# Patient Record
Sex: FEMALE | Race: BLACK OR AFRICAN AMERICAN | Employment: UNEMPLOYED | URBAN - NONMETROPOLITAN AREA
[De-identification: names, ages, dates, MRNs, and addresses within clinical notes are randomized per-mention and may not be internally consistent; named-entity substitution may affect disease eponyms.]

---

## 2022-01-16 ENCOUNTER — APPOINTMENT (OUTPATIENT)
Dept: GENERAL RADIOLOGY | Age: 62
End: 2022-01-16
Attending: EMERGENCY MEDICINE
Payer: COMMERCIAL

## 2022-01-16 ENCOUNTER — HOSPITAL ENCOUNTER (EMERGENCY)
Age: 62
Discharge: ANOTHER HOSPITAL | End: 2022-01-16
Attending: EMERGENCY MEDICINE
Payer: COMMERCIAL

## 2022-01-16 ENCOUNTER — HOSPITAL ENCOUNTER (INPATIENT)
Age: 62
LOS: 1 days | Discharge: HOME | DRG: 310 | End: 2022-01-17
Attending: EMERGENCY MEDICINE | Admitting: HOSPITALIST
Payer: COMMERCIAL

## 2022-01-16 VITALS
RESPIRATION RATE: 20 BRPM | SYSTOLIC BLOOD PRESSURE: 114 MMHG | BODY MASS INDEX: 36.19 KG/M2 | OXYGEN SATURATION: 98 % | HEIGHT: 64 IN | TEMPERATURE: 98.8 F | DIASTOLIC BLOOD PRESSURE: 78 MMHG | WEIGHT: 212 LBS | HEART RATE: 96 BPM

## 2022-01-16 DIAGNOSIS — I48.91 NEW ONSET ATRIAL FIBRILLATION (HCC): Primary | ICD-10-CM

## 2022-01-16 DIAGNOSIS — I48.91 ATRIAL FIBRILLATION WITH RVR (HCC): Primary | ICD-10-CM

## 2022-01-16 LAB
ALBUMIN SERPL-MCNC: 4.2 G/DL (ref 3.5–5)
ALBUMIN/GLOB SERPL: 1.1 {RATIO} (ref 1.1–2.2)
ALP SERPL-CCNC: 70 U/L (ref 45–117)
ALT SERPL-CCNC: 29 U/L (ref 12–78)
ANION GAP SERPL CALC-SCNC: 12 MMOL/L (ref 5–15)
APTT PPP: 23.4 SEC (ref 22.1–31)
AST SERPL W P-5'-P-CCNC: 23 U/L (ref 15–37)
BASOPHILS # BLD: 0.1 K/UL (ref 0–0.2)
BASOPHILS NFR BLD: 1 % (ref 0–2.5)
BILIRUB SERPL-MCNC: 0.4 MG/DL (ref 0.2–1)
BNP SERPL-MCNC: 140 PG/ML
BUN SERPL-MCNC: 13 MG/DL (ref 6–20)
BUN/CREAT SERPL: 21 (ref 12–20)
CA-I BLD-MCNC: 9.5 MG/DL (ref 8.5–10.1)
CHLORIDE SERPL-SCNC: 103 MMOL/L (ref 97–108)
CO2 SERPL-SCNC: 27 MMOL/L (ref 21–32)
CREAT SERPL-MCNC: 0.61 MG/DL (ref 0.55–1.02)
EOSINOPHIL # BLD: 0.2 K/UL (ref 0–0.7)
EOSINOPHIL NFR BLD: 2 % (ref 0.9–2.9)
ERYTHROCYTE [DISTWIDTH] IN BLOOD BY AUTOMATED COUNT: 15.9 % (ref 11.5–14.5)
FLUAV RNA SPEC QL NAA+PROBE: NOT DETECTED
FLUBV RNA SPEC QL NAA+PROBE: NOT DETECTED
GLOBULIN SER CALC-MCNC: 3.8 G/DL (ref 2–4)
GLUCOSE SERPL-MCNC: 122 MG/DL (ref 65–100)
HCT VFR BLD AUTO: 41.2 % (ref 36–46)
HGB BLD-MCNC: 13.6 G/DL (ref 13.5–17.5)
INR PPP: 0.9 (ref 0.9–1.1)
LYMPHOCYTES # BLD: 5.1 K/UL (ref 1–4.8)
LYMPHOCYTES NFR BLD: 55 % (ref 20.5–51.1)
MCH RBC QN AUTO: 24.5 PG (ref 31–34)
MCHC RBC AUTO-ENTMCNC: 33.2 G/DL (ref 31–36)
MCV RBC AUTO: 74 FL (ref 80–100)
MONOCYTES # BLD: 0.6 K/UL (ref 0.2–2.4)
MONOCYTES NFR BLD: 6 % (ref 1.7–9.3)
NEUTS SEG # BLD: 3.4 K/UL (ref 1.8–7.7)
NEUTS SEG NFR BLD: 36 % (ref 42–75)
NRBC # BLD: 0.03 K/UL
NRBC BLD-RTO: 0.4 PER 100 WBC
PLATELET # BLD AUTO: 367 K/UL (ref 150–400)
PMV BLD AUTO: 8.4 FL (ref 6.5–11.5)
POTASSIUM SERPL-SCNC: 3.7 MMOL/L (ref 3.5–5.1)
PROT SERPL-MCNC: 8 G/DL (ref 6.4–8.2)
PROTHROMBIN TIME: 9.3 SEC (ref 9–11.1)
RBC # BLD AUTO: 5.56 M/UL (ref 4.5–5.9)
SARS-COV-2, COV2: NOT DETECTED
SODIUM SERPL-SCNC: 142 MMOL/L (ref 136–145)
THERAPEUTIC RANGE,PTTT: NORMAL SEC (ref 82–109)
TROPONIN-HIGH SENSITIVITY: 11 NG/L (ref 0–51)
WBC # BLD AUTO: 9.4 K/UL (ref 4.4–11.3)

## 2022-01-16 PROCEDURE — 99284 EMERGENCY DEPT VISIT MOD MDM: CPT

## 2022-01-16 PROCEDURE — 93005 ELECTROCARDIOGRAM TRACING: CPT

## 2022-01-16 PROCEDURE — 83880 ASSAY OF NATRIURETIC PEPTIDE: CPT

## 2022-01-16 PROCEDURE — 74011250637 HC RX REV CODE- 250/637: Performed by: HOSPITALIST

## 2022-01-16 PROCEDURE — 87636 SARSCOV2 & INF A&B AMP PRB: CPT

## 2022-01-16 PROCEDURE — 96375 TX/PRO/DX INJ NEW DRUG ADDON: CPT

## 2022-01-16 PROCEDURE — 36415 COLL VENOUS BLD VENIPUNCTURE: CPT

## 2022-01-16 PROCEDURE — 74011000250 HC RX REV CODE- 250: Performed by: EMERGENCY MEDICINE

## 2022-01-16 PROCEDURE — 80053 COMPREHEN METABOLIC PANEL: CPT

## 2022-01-16 PROCEDURE — 85730 THROMBOPLASTIN TIME PARTIAL: CPT

## 2022-01-16 PROCEDURE — 65660000000 HC RM CCU STEPDOWN

## 2022-01-16 PROCEDURE — 99223 1ST HOSP IP/OBS HIGH 75: CPT | Performed by: INTERNAL MEDICINE

## 2022-01-16 PROCEDURE — 85025 COMPLETE CBC W/AUTO DIFF WBC: CPT

## 2022-01-16 PROCEDURE — 74011000258 HC RX REV CODE- 258: Performed by: EMERGENCY MEDICINE

## 2022-01-16 PROCEDURE — 74011250636 HC RX REV CODE- 250/636: Performed by: HOSPITALIST

## 2022-01-16 PROCEDURE — 84484 ASSAY OF TROPONIN QUANT: CPT

## 2022-01-16 PROCEDURE — 71045 X-RAY EXAM CHEST 1 VIEW: CPT

## 2022-01-16 PROCEDURE — 96374 THER/PROPH/DIAG INJ IV PUSH: CPT

## 2022-01-16 PROCEDURE — 96376 TX/PRO/DX INJ SAME DRUG ADON: CPT

## 2022-01-16 PROCEDURE — 99285 EMERGENCY DEPT VISIT HI MDM: CPT

## 2022-01-16 PROCEDURE — 85610 PROTHROMBIN TIME: CPT

## 2022-01-16 PROCEDURE — 74011000250 HC RX REV CODE- 250: Performed by: HOSPITALIST

## 2022-01-16 RX ORDER — HYDROCHLOROTHIAZIDE 12.5 MG/1
12.5 TABLET ORAL DAILY
COMMUNITY
End: 2022-01-17

## 2022-01-16 RX ORDER — ACETAMINOPHEN 650 MG/1
650 SUPPOSITORY RECTAL
Status: DISCONTINUED | OUTPATIENT
Start: 2022-01-16 | End: 2022-01-17 | Stop reason: HOSPADM

## 2022-01-16 RX ORDER — ACETAMINOPHEN 325 MG/1
650 TABLET ORAL
Status: DISCONTINUED | OUTPATIENT
Start: 2022-01-16 | End: 2022-01-17 | Stop reason: HOSPADM

## 2022-01-16 RX ORDER — OMEPRAZOLE 20 MG/1
20 CAPSULE, DELAYED RELEASE ORAL DAILY
COMMUNITY

## 2022-01-16 RX ORDER — METOPROLOL SUCCINATE 50 MG/1
TABLET, EXTENDED RELEASE ORAL DAILY
COMMUNITY
End: 2022-01-17

## 2022-01-16 RX ORDER — DILTIAZEM HYDROCHLORIDE 5 MG/ML
10 INJECTION INTRAVENOUS
Status: COMPLETED | OUTPATIENT
Start: 2022-01-16 | End: 2022-01-16

## 2022-01-16 RX ORDER — LANOLIN ALCOHOL/MO/W.PET/CERES
325 CREAM (GRAM) TOPICAL
COMMUNITY

## 2022-01-16 RX ORDER — POLYETHYLENE GLYCOL 3350 17 G/17G
17 POWDER, FOR SOLUTION ORAL DAILY PRN
Status: DISCONTINUED | OUTPATIENT
Start: 2022-01-16 | End: 2022-01-17 | Stop reason: HOSPADM

## 2022-01-16 RX ORDER — SODIUM CHLORIDE 0.9 % (FLUSH) 0.9 %
5-40 SYRINGE (ML) INJECTION EVERY 8 HOURS
Status: DISCONTINUED | OUTPATIENT
Start: 2022-01-16 | End: 2022-01-17 | Stop reason: HOSPADM

## 2022-01-16 RX ORDER — ONDANSETRON 2 MG/ML
4 INJECTION INTRAMUSCULAR; INTRAVENOUS
Status: DISCONTINUED | OUTPATIENT
Start: 2022-01-16 | End: 2022-01-17 | Stop reason: HOSPADM

## 2022-01-16 RX ORDER — METOPROLOL TARTRATE 50 MG/1
50 TABLET ORAL EVERY 12 HOURS
Status: DISCONTINUED | OUTPATIENT
Start: 2022-01-16 | End: 2022-01-17

## 2022-01-16 RX ORDER — ENOXAPARIN SODIUM 100 MG/ML
1 INJECTION SUBCUTANEOUS EVERY 12 HOURS
Status: DISCONTINUED | OUTPATIENT
Start: 2022-01-16 | End: 2022-01-17

## 2022-01-16 RX ORDER — ONDANSETRON 4 MG/1
4 TABLET, ORALLY DISINTEGRATING ORAL
Status: DISCONTINUED | OUTPATIENT
Start: 2022-01-16 | End: 2022-01-17 | Stop reason: HOSPADM

## 2022-01-16 RX ORDER — AMLODIPINE BESYLATE 5 MG/1
5 TABLET ORAL DAILY
COMMUNITY
End: 2022-01-17

## 2022-01-16 RX ORDER — OLOPATADINE HYDROCHLORIDE 1 MG/ML
1 SOLUTION/ DROPS OPHTHALMIC 2 TIMES DAILY
COMMUNITY

## 2022-01-16 RX ORDER — SODIUM CHLORIDE 0.9 % (FLUSH) 0.9 %
5-40 SYRINGE (ML) INJECTION AS NEEDED
Status: DISCONTINUED | OUTPATIENT
Start: 2022-01-16 | End: 2022-01-17 | Stop reason: HOSPADM

## 2022-01-16 RX ORDER — DILTIAZEM HYDROCHLORIDE 30 MG/1
60 TABLET, FILM COATED ORAL
Status: DISCONTINUED | OUTPATIENT
Start: 2022-01-16 | End: 2022-01-17

## 2022-01-16 RX ADMIN — METOPROLOL TARTRATE 50 MG: 50 TABLET, FILM COATED ORAL at 20:48

## 2022-01-16 RX ADMIN — DEXTROSE MONOHYDRATE 10 MG/HR: 50 INJECTION, SOLUTION INTRAVENOUS at 14:04

## 2022-01-16 RX ADMIN — Medication 10 ML: at 14:40

## 2022-01-16 RX ADMIN — DILTIAZEM HYDROCHLORIDE 10 MG: 5 INJECTION INTRAVENOUS at 07:40

## 2022-01-16 RX ADMIN — DILTIAZEM HYDROCHLORIDE 10 MG: 5 INJECTION INTRAVENOUS at 08:33

## 2022-01-16 RX ADMIN — METOPROLOL TARTRATE 50 MG: 50 TABLET, FILM COATED ORAL at 14:30

## 2022-01-16 RX ADMIN — ENOXAPARIN SODIUM 100 MG: 100 INJECTION SUBCUTANEOUS at 15:29

## 2022-01-16 RX ADMIN — DILTIAZEM HYDROCHLORIDE 10 MG/HR: 5 INJECTION, SOLUTION INTRAVENOUS at 08:57

## 2022-01-16 RX ADMIN — DILTIAZEM HYDROCHLORIDE 60 MG: 30 TABLET, FILM COATED ORAL at 15:09

## 2022-01-16 NOTE — ED TRIAGE NOTES
Patient reports that when she woke up around 0700 she felt like her heart was beating really fast.  Patient still reports palpitations but denies chest pain. Denies cardiac hx but has seen a cardiologist in the past.  Patient reports that she took 1 aspirin PTA, thinks that it was a 325 mg aspirin.

## 2022-01-16 NOTE — ED TRIAGE NOTES
Pt arrives from [facility] via EMS with complaint of \"heart beating fast\". Symptoms started this morning at 0700. EMS reported patient received two 10mg diltiazem IVP prior to being placed on a Diltiazem gtt.      Pt currently denies palpitations, chest pain, SOB, dizzy/lightheadedness, N/V.

## 2022-01-16 NOTE — ED PROVIDER NOTES
Patient awakened at 7 AM with rapid heart rate and palpitations. She denies chest pain, shortness of breath, nausea or diaphoresis. She denies any cardiac history. Past Medical History:   Diagnosis Date    GERD (gastroesophageal reflux disease)     Hypertension        History reviewed. No pertinent surgical history. History reviewed. No pertinent family history. Social History     Socioeconomic History    Marital status: UNKNOWN     Spouse name: Not on file    Number of children: Not on file    Years of education: Not on file    Highest education level: Not on file   Occupational History    Not on file   Tobacco Use    Smoking status: Never Smoker    Smokeless tobacco: Never Used   Substance and Sexual Activity    Alcohol use: Not Currently    Drug use: Never    Sexual activity: Not on file   Other Topics Concern    Not on file   Social History Narrative    Not on file     Social Determinants of Health     Financial Resource Strain:     Difficulty of Paying Living Expenses: Not on file   Food Insecurity:     Worried About Running Out of Food in the Last Year: Not on file    Joan of Food in the Last Year: Not on file   Transportation Needs:     Lack of Transportation (Medical): Not on file    Lack of Transportation (Non-Medical):  Not on file   Physical Activity:     Days of Exercise per Week: Not on file    Minutes of Exercise per Session: Not on file   Stress:     Feeling of Stress : Not on file   Social Connections:     Frequency of Communication with Friends and Family: Not on file    Frequency of Social Gatherings with Friends and Family: Not on file    Attends Temple Services: Not on file    Active Member of Clubs or Organizations: Not on file    Attends Club or Organization Meetings: Not on file    Marital Status: Not on file   Intimate Partner Violence:     Fear of Current or Ex-Partner: Not on file    Emotionally Abused: Not on file    Physically Abused: Not on file    Sexually Abused: Not on file   Housing Stability:     Unable to Pay for Housing in the Last Year: Not on file    Number of Places Lived in the Last Year: Not on file    Unstable Housing in the Last Year: Not on file         ALLERGIES: Lisinopril    Review of Systems   Constitutional: Negative. Negative for chills and fever. HENT: Negative. Eyes: Negative. Respiratory: Negative. Negative for shortness of breath. Cardiovascular: Positive for palpitations. Negative for chest pain. Gastrointestinal: Negative. Endocrine: Negative. Genitourinary: Negative. Skin: Negative. Allergic/Immunologic: Negative. Neurological: Negative. Hematological: Negative. Psychiatric/Behavioral: Negative. All other systems reviewed and are negative. Vitals:    01/16/22 0726 01/16/22 0731   BP: (!) 125/106    Pulse: (!) 159 (!) 180   Resp: 18    Temp: 98.8 °F (37.1 °C)    SpO2: 97%    Weight: 96.2 kg (212 lb)    Height: 5' 4\" (1.626 m)             Physical Exam  Vitals and nursing note reviewed. Constitutional:       Appearance: She is well-developed. HENT:      Head: Normocephalic and atraumatic. Cardiovascular:      Rate and Rhythm: Tachycardia present. Rhythm irregular. Heart sounds: Normal heart sounds. Pulmonary:      Breath sounds: Normal breath sounds. Abdominal:      General: Bowel sounds are normal.      Palpations: Abdomen is soft. Musculoskeletal:         General: Normal range of motion. Cervical back: Normal range of motion and neck supple. Right lower leg: Edema present. Left lower leg: Edema present. Neurological:      General: No focal deficit present. Mental Status: She is alert.    Psychiatric:         Mood and Affect: Mood normal.         Behavior: Behavior normal.          [facility] 0800 change of shift note 72-year-old female with no significant medical problem woke this morning after going to the bathroom felt palpitations noted a rapid heart rate and brought to ED by her  EKG shows atrial fibrillation with a ventricular rate of 132  Nonspecific ST-T changes no acute injury pattern. Patient initially treated with 10 mg of diltiazem with some decrease in her ventricular rate and adequate blood pressure this lasted for approximately 30 minutes additional 10 mg dose of diltiazem with transient slowing of ventricular rate now on a diltiazem drip at 10 mg/h heart rate running approximately 105-110 with blood pressure 069 systolic patient has no complaints of chest pain or shortness of breath. Blood works essentially normal troponin is negative. No ability to hospitalize patient here at this facility Case discussed with initially Dr. Karrie Goyal ED attending [address] [facility]-referred to hospitalist discussed with Wili Crowder. After discussion with between hospitalist and ED attending patient accepted to inpatient service may be boarded in the ED on arrival.  Wili Crowder, has no anticoagulation at this time. Procedures    Critical care note 40 minutes patient arrived in rapid atrial fibrillation treated with several doses of diltiazem with controlled ventricular rate. No evidence of significant congestive heart failure though patient does have some lower extremity edema there is cardiomegaly on the chest x-ray.   No evidence of acute MI.

## 2022-01-16 NOTE — CONSULTS
Cardiac Electrophysiology Hospital Consultation Note   REFERRING PROVIDER: Dr Cesar Cosme  Subjective:      Benedicto Medeiros is a 64 y.o. patient who is seen for evaluation of atrial fibrillation RVR   ECG AFIB 132 bpm   She was transferred from [place] [address] because [facility] was full  She had this suddenly and first time she has been told  No family hx of AF  She had echo done years ago in a cardiologist's office, cannot remember name or why  No chest pain or BRYANT recently  Chest xray reported cardiomegaly   Hs troponin was normal at 11  She takes med for hypertension   No ischemia on ECG    On cardizem 5 mg / hr she has converted to NSR          EKG: atrial fibrillation, rate 132 bpm       Patient Active Problem List   Diagnosis Code    Rapid atrial fibrillation ([facility] [address] I48.91     Current Facility-Administered Medications   Medication Dose Route Frequency Provider Last Rate Last Admin    dilTIAZem (CARDIZEM) 125 mg in dextrose 5% 125 mL infusion  0-15 mg/hr IntraVENous TITRATE Joselyn Elmore, DO   Stopped at 01/16/22 1511    metoprolol tartrate (LOPRESSOR) tablet 50 mg  50 mg Oral Q12H Dahiana Rodriguez MD   50 mg at 01/16/22 1430    sodium chloride (NS) flush 5-40 mL  5-40 mL IntraVENous Q8H Dahiana Rodriguez MD        sodium chloride (NS) flush 5-40 mL  5-40 mL IntraVENous PRN Dahiana Rodriguez MD        acetaminophen (TYLENOL) tablet 650 mg  650 mg Oral Q6H PRN Dahiana Rodriguez MD        Or   [facility] acetaminophen (TYLENOL) suppository 650 mg  650 mg Rectal Q6H PRN Dahiana Rodriguez MD        polyethylene glycol (MIRALAX) packet 17 g  17 g Oral DAILY PRN Dahiana Rodriguez MD        ondansetron (ZOFRAN ODT) tablet 4 mg  4 mg Oral Q8H PRN Dahiana Rodriguez MD        Or    ondansetron [facility]) injection 4 mg  4 mg IntraVENous Q6H PRN Ana Maria Ybarra MD        enoxaparin (LOVENOX) injection 100 mg  1 mg/kg SubCUTAneous Q12H Nirali Ybarra MD        dilTIAZem IR (CARDIZEM) tablet 60 mg  60 mg Oral Ananth Myers MD   60 mg at 01/16/22 1509     Current Outpatient Medications Medication Sig Dispense Refill    hydroCHLOROthiazide (HYDRODIURIL) 12.5 mg tablet Take 12.5 mg by mouth daily.  amLODIPine (Norvasc) 5 mg tablet Take 5 mg by mouth daily.  metoprolol succinate (TOPROL-XL) 50 mg XL tablet Take  by mouth daily. Allergies   Allergen Reactions    Lisinopril Angioedema     Past Medical History:   Diagnosis Date    GERD (gastroesophageal reflux disease)     Hypertension      Past surgical history: right knee replacement    No family history of AF  Social History     Tobacco Use    Smoking status: Never Smoker    Smokeless tobacco: Never Used   Substance Use Topics    Alcohol use: Not Currently        Review of Systems:   Constitutional: Negative for fever, chills, weight loss, malaise/fatigue. HEENT: Negative for nosebleeds, vision changes. Respiratory: Negative for cough, hemoptysis  Cardiovascular: Negative for chest pain, + palpitations, no orthopnea, claudication, leg swelling, syncope, and PND. Gastrointestinal: Negative for nausea, vomiting, diarrhea, blood in stool and melena. Genitourinary: Negative for dysuria, and hematuria. Musculoskeletal: Negative for myalgias, arthralgia. Skin: Negative for rash. Heme: Does not bleed or bruise easily. Neurological: Negative for speech change and focal weakness     Objective:     Visit Vitals  BP (!) 148/77   Pulse 83   Temp 98.8 °F (37.1 °C)   Resp 18   Ht 5' 4\" (1.626 m)   Wt 212 lb 1.3 oz (96.2 kg)   SpO2 92%   BMI 36.40 kg/m²      Physical Exam:   Constitutional: well-developed and well-nourished. No respiratory distress. Head: Normocephalic and atraumatic. Eyes: Pupils are equal, round  ENT: hearing normal  Neck: supple. No JVD present. Cardiovascular: Normal rate, regular rhythm. Exam reveals no gallop and no friction rub. No murmur heard. Pulmonary/Chest: Effort normal and breath sounds normal. No wheezes. Abdominal: Soft, no tenderness. Mild obesity  Musculoskeletal: no edema. Neurological: alert,oriented. Skin: Skin is warm and dry  Psychiatric: normal mood and affect. Behavior is normal. Judgment and thought content normal.         Assessment/Plan:   PAF RVR  Hypertension   cardiomegaly    Agree with changing norvasc and cardizem IV to beta blocker. Get echocardiogram as she has enlarged heart reported on chest ray and there is no obvious trigger  She has hypertension as risk factor along with gender so CHADS 2 vasc score now is 2. If echo is abnormal she will need cardiac cath or stress test  She will need NOAC when going home   She did not like coumadin. She took it after knee replacement to prevent DVT      Thank you for involving me in this patient's care and please call with further concerns or questions. Jef Grace M.D.   Electrophysiology/Cardiology  [facility]  [address] [place] [address]                                [phone]

## 2022-01-16 NOTE — ED NOTES
Pt resting quietly in bed. Denies problems or concerns at present. Denies pain. No distress noted.  Will cont to monitor

## 2022-01-16 NOTE — Clinical Note
Status[de-identified] INPATIENT [101]   Type of Bed: Intermediate Care [34]   Cardiac Monitoring Required?: Yes   Inpatient Hospitalization Certified Necessary for the Following Reasons: 3.  Patient receiving treatment that can only be provided in an inpatient setting (further clarification in H&P documentation)   Admitting Diagnosis: Rapid atrial fibrillation [facility]) [[id number]]   Admitting Physician: Zaki Trinidad [[id number]]   Attending Physician: Zaki Trinidad [[id number]]   Estimated Length of Stay: 3-4 Midnights   Discharge Plan[de-identified] Other (Specify)

## 2022-01-16 NOTE — ED NOTES
Patient arrives as transfer from [facility] for afib rvr. Rate controlled on cardizem drip. No other complaints. VITAL SIGNS:  Patient Vitals for the past 4 hrs:   Temp Pulse Resp BP SpO2   01/16/22 1320 98.8 °F (37.1 °C) (!) 108 18 127/86 94 %         LABS:  Recent Results (from the past 6 hour(s))   EKG, 12 LEAD, INITIAL    Collection Time: 01/16/22  7:24 AM   Result Value Ref Range    Ventricular Rate 132 BPM    Atrial Rate 122 BPM    P-R Interval 124 ms    QRS Duration 99 ms    Q-T Interval 315 ms    QTC Calculation (Bezet) 467 ms    Calculated R Axis -4 degrees    Calculated T Axis 16 degrees    Diagnosis       Atrial fibrillation  RSR' in V1 or V2, right VCD or RVH  Borderline ST depression, lateral leads     CBC WITH AUTOMATED DIFF    Collection Time: 01/16/22  7:35 AM   Result Value Ref Range    WBC 9.4 4.4 - 11.3 K/uL    RBC 5.56 4.50 - 5.90 M/uL    HGB 13.6 13.5 - 17.5 g/dL    HCT 41.2 36 - 46 %    MCV 74.0 (L) 80 - 100 FL    MCH 24.5 (L) 31 - 34 PG    MCHC 33.2 31.0 - 36.0 g/dL    RDW 15.9 (H) 11.5 - 14.5 %    PLATELET 939 563 - 994 K/uL    MPV 8.4 6.5 - 11.5 FL    NRBC 0.4  WBC    ABSOLUTE NRBC 0.03 K/uL    ABS. LYMPHOCYTES 5.1 (H) 1.0 - 4.8 K/UL    NEUTROPHILS 36 (L) 42 - 75 %    LYMPHOCYTES 55 (H) 20.5 - 51.1 %    MONOCYTES 6 1.7 - 9.3 %    EOSINOPHILS 2 0.9 - 2.9 %    BASOPHILS 1 0.0 - 2.5 %    ABS. NEUTROPHILS 3.4 1.8 - 7.7 K/UL    ABS. MONOCYTES 0.6 0.2 - 2.4 K/UL    ABS. EOSINOPHILS 0.2 0.0 - 0.7 K/UL    ABS.  BASOPHILS 0.1 0.0 - 0.2 K/UL   PROTHROMBIN TIME + INR    Collection Time: 01/16/22  7:35 AM   Result Value Ref Range    Prothrombin time 9.3 9.0 - 11.1 sec    INR 0.9 0.9 - 1.1     METABOLIC PANEL, COMPREHENSIVE    Collection Time: 01/16/22  7:35 AM   Result Value Ref Range    Sodium 142 136 - 145 mmol/L    Potassium 3.7 3.5 - 5.1 mmol/L    Chloride 103 97 - 108 mmol/L    CO2 27 21 - 32 mmol/L    Anion gap 12 5 - 15 mmol/L    Glucose 122 (H) 65 - 100 mg/dL    BUN 13 6 - 20 mg/dL Creatinine 0.61 0.55 - 1.02 mg/dL    BUN/Creatinine ratio 21 (H) 12 - 20      GFR est AA >60 >60 ml/min/1.73m2    GFR est non-AA >60 >60 ml/min/1.73m2    Calcium 9.5 8.5 - 10.1 mg/dL    Bilirubin, total 0.4 0.2 - 1.0 mg/dL    AST (SGOT) 23 15 - 37 U/L    ALT (SGPT) 29 12 - 78 U/L    Alk. phosphatase 70 45 - 117 U/L    Protein, total 8.0 6.4 - 8.2 g/dL    Albumin 4.2 3.5 - 5.0 g/dL    Globulin 3.8 2.0 - 4.0 g/dL    A-G Ratio 1.1 1.1 - 2.2     TROPONIN-HIGH SENSITIVITY    Collection Time: 01/16/22  7:35 AM   Result Value Ref Range    Troponin-High Sensitivity 11 0 - 51 ng/L   NT-PRO BNP    Collection Time: 01/16/22  7:35 AM   Result Value Ref Range    NT pro- (H) <125 pg/mL   PTT    Collection Time: 01/16/22  7:35 AM   Result Value Ref Range    aPTT 23.4 22.1 - 31.0 sec    aPTT, therapeutic range   82 - 109 sec   COVID-19 WITH INFLUENZA A/B    Collection Time: 01/16/22  9:45 AM   Result Value Ref Range    SARS-CoV-2 Not Detected Not Detected      Influenza A by PCR Not Detected Not Detected      Influenza B by PCR Not Detected Not Detected          IMAGING:  No orders to display         Medications During Visit:  Medications   dilTIAZem (CARDIZEM) 125 mg in dextrose 5% 125 mL infusion (has no administration in time range)         DECISION MAKING:  Arnel Shepard is a 64 y.o. female who comes in as above. Patient stable on cardizem drip. Hospitalist notified      IMPRESSION:  1. Atrial fibrillation with RVR (HCC)        DISPOSITION:  Admitted      Perfect Serve Consult for Admission  1:24 PM    ED Room Number: ER20/20  Patient Name and age:  Arnel Shepard 64 y.o.  female  Working Diagnosis:   1. Atrial fibrillation with RVR ([address]        COVID-19 Suspicion:  no  Sepsis present:  no  Reassessment needed: no  Code Status:  Full Code  Readmission: no  Isolation Requirements:  no  Recommended Level of Care:  telemetry  Department:[facility] Adult ED - 21   Other:  [facility] Stable.  On cardizem.

## 2022-01-16 NOTE — H&P
[facility] Adult  Hospitalist Group  History and Physical    Date of Service:  1/16/2022  Primary Care Provider: Raine Zuleta MD  Source of information: The patient    Chief Complaint: Irregular Heart Beat      History of Presenting Illness:   Kelli Navarro is a 64 y.o. female who presents with palpipation     Patient arrives as transfer from [facility] of HTN, GERD   Patient awakened at 7 AM with rapid heart rate and palpitations. She denies chest pain, shortness of breath, nausea or diaphoresis. She denies any cardiac history. She was started with cardizem drip in [place] and transferred to [facility] ER. One hour later in ER, noticed converted to Sinus      REVIEW OF SYSTEMS:  General: hpi, no fever,no changes of weight  HEENT: no headache, no vision changes, no nose discharge, no hearing changes   RES: no wheezing, no cough, no sob  CVS: HPI   Muscular: no joint swelling, no muscle pain, no leg swelling  Skin: no rash, no itching   GI: no vomiting, no diarrhea  : no dysuria, no hematuria  Hemo: no gum bleeding, no petechial   Neuro: no sensation changes, no focal weakness   Endo: no polydipsia   Psych: denied depression     Past Medical History:   Diagnosis Date    GERD (gastroesophageal reflux disease)     Hypertension       No past surgical history on file. Prior to Admission medications    Medication Sig Start Date End Date Taking? Authorizing Provider   hydroCHLOROthiazide (HYDRODIURIL) 12.5 mg tablet Take 12.5 mg by mouth daily. Yes Provider, Historical   amLODIPine (Norvasc) 5 mg tablet Take 5 mg by mouth daily. Yes Provider, Historical   metoprolol succinate (TOPROL-XL) 50 mg XL tablet Take  by mouth daily. Yes Provider, Historical     Allergies   Allergen Reactions    Lisinopril Angioedema      No family history on file. no DM, no cad  Social History:  reports that she has never smoked. She has never used smokeless tobacco. She reports previous alcohol use.  She reports that she does not use drugs. Family and social history were personally reviewed, all pertinent and relevant details are outlined as above. Objective:     Visit Vitals  BP (!) 110/54   Pulse 74   Temp 98.8 °F (37.1 °C)   Resp 18   Ht 5' 4\" (1.626 m)   Wt 96.2 kg (212 lb 1.3 oz)   SpO2 92%   BMI 36.40 kg/m²      O2 Device: None (Room air)    PHYSICAL EXAM:    General: Alert x oriented x 3, awake, no acute distress  HEENT: PEERL, EOMI, moist mucus membranes  Neck: Supple, no JVD, no meningeal signs  Chest: Clear to auscultation bilaterally   CVS: RRR, S1 S2 heard, no murmurs/rubs/gallops  Abd: Soft, non-tender, non-distended, +bowel sounds   Ext: No clubbing, no cyanosis, no edema  Neuro/Psych: Pleasant mood and affect,   Cap refill: Brisk, less than 3 seconds  Pulses: 2+, symmetric in all extremities  Skin: Warm, dry, without rashes or lesions    Data Review: All diagnostic labs and studies have been reviewed. Abnormal Labs Reviewed - No data to display    All Micro Results     None          IMAGING:   No orders to display      XR CHEST PORT    Result Date: 1/16/2022  Unchanged cardiomegaly. Possible trace right pleural effusion. ECG/ECHO:    Results for orders placed or performed during the hospital encounter of 01/16/22   EKG, 12 LEAD, INITIAL   Result Value Ref Range    Ventricular Rate 132 BPM    Atrial Rate 122 BPM    P-R Interval 124 ms    QRS Duration 99 ms    Q-T Interval 315 ms    QTC Calculation (Bezet) 467 ms    Calculated R Axis -4 degrees    Calculated T Axis 16 degrees    Diagnosis       Atrial fibrillation  RSR' in V1 or V2, right VCD or RVH  Borderline ST depression, lateral leads          Assessment:   Given the patient's current clinical presentation, there is a high level of concern for decompensation if discharged from the emergency department.  Complex decision making was performed, which includes reviewing the patient's available past medical records, laboratory results, and imaging studies. Active Problems:    Rapid atrial fibrillation ([facility] [address] (1/16/2022)      Plan:   1. Rapid afib: new onset, response to cardizem drip, converted to sinus now. Will start po cardizem 60mg q8h and wean off cardizem drip. Start her home metoprolol . Echo. TSH, cardilogist consult   2. HTN: start metoprolol. Hold home HCTZ and amilodipine, bp meds will further be adjusted     BMI: Body mass index is 36.4 kg/m². . This patient: Meets criteria for obesity given BMI >/= 30 and < 40 due to excess calories/nutritional. Weight loss and lifestyle modifications should be encouraged. DIET: ADULT DIET Regular; Low Sodium (2 gm)   ISOLATION PRECAUTIONS: There are currently no Active Isolations  CODE STATUS: Full Code   DVT PROPHYLAXIS: Lovenox  FUNCTIONAL STATUS PRIOR TO HOSPITALIZATION: Fully active and ambulatory; able to carry on all self-care without restriction. EARLY MOBILITY ASSESSMENT: Recommend routine ambulation while hospitalized with the assistance of nursing staff  ANTICIPATED DISCHARGE: 24-48 hours. EMERGENCY CONTACT/SURROGATE DECISION MAKER: none     CRITICAL CARE WAS PERFORMED FOR THIS ENCOUNTER: NO.      Signed By: Sherry Marvin MD     January 16, 2022         Please note that this dictation may have been completed with Dragon, the computer voice recognition software. Quite often unanticipated grammatical, syntax, homophones, and other interpretive errors are inadvertently transcribed by the computer software. Please disregard these errors. Please excuse any errors that have escaped final proofreading.

## 2022-01-17 ENCOUNTER — APPOINTMENT (OUTPATIENT)
Dept: NON INVASIVE DIAGNOSTICS | Age: 62
DRG: 310 | End: 2022-01-17
Attending: HOSPITALIST
Payer: COMMERCIAL

## 2022-01-17 VITALS
RESPIRATION RATE: 18 BRPM | WEIGHT: 206 LBS | DIASTOLIC BLOOD PRESSURE: 76 MMHG | SYSTOLIC BLOOD PRESSURE: 135 MMHG | HEART RATE: 91 BPM | OXYGEN SATURATION: 98 % | TEMPERATURE: 97.9 F | HEIGHT: 64 IN | BODY MASS INDEX: 35.17 KG/M2

## 2022-01-17 PROBLEM — I48.91 RAPID ATRIAL FIBRILLATION (HCC): Status: RESOLVED | Noted: 2022-01-16 | Resolved: 2022-01-17

## 2022-01-17 LAB
ATRIAL RATE: 122 BPM
CALCULATED R AXIS, ECG10: -4 DEGREES
CALCULATED T AXIS, ECG11: 16 DEGREES
DIAGNOSIS, 93000: NORMAL
ECHO AO ROOT DIAM: 3.4 CM
ECHO AO ROOT INDEX: 1.72 CM/M2
ECHO AV AREA PEAK VELOCITY: 3.4 CM2
ECHO AV AREA PEAK VELOCITY: 3.4 CM2
ECHO AV PEAK GRADIENT: 8 MMHG
ECHO AV PEAK VELOCITY: 1.4 M/S
ECHO AV VELOCITY RATIO: 0.71
ECHO EST RA PRESSURE: 3 MMHG
ECHO LA DIAMETER INDEX: 1.72 CM/M2
ECHO LA DIAMETER: 3.4 CM
ECHO LA TO AORTIC ROOT RATIO: 1
ECHO LA VOL 2C: 67 ML (ref 22–52)
ECHO LA VOL 4C: 44 ML (ref 22–52)
ECHO LA VOLUME AREA LENGTH: 62 ML
ECHO LA VOLUME INDEX A2C: 34 ML/M2 (ref 16–34)
ECHO LA VOLUME INDEX A4C: 22 ML/M2 (ref 16–34)
ECHO LA VOLUME INDEX AREA LENGTH: 31 ML/M2 (ref 16–34)
ECHO LV E' LATERAL VELOCITY: 9 CM/S
ECHO LV E' SEPTAL VELOCITY: 7 CM/S
ECHO LV FRACTIONAL SHORTENING: 30 % (ref 28–44)
ECHO LV INTERNAL DIMENSION DIASTOLE INDEX: 2.37 CM/M2
ECHO LV INTERNAL DIMENSION DIASTOLIC: 4.7 CM (ref 3.9–5.3)
ECHO LV INTERNAL DIMENSION SYSTOLIC INDEX: 1.67 CM/M2
ECHO LV INTERNAL DIMENSION SYSTOLIC: 3.3 CM
ECHO LV IVSD: 1 CM (ref 0.6–0.9)
ECHO LV MASS 2D: 175.8 G (ref 67–162)
ECHO LV MASS INDEX 2D: 88.8 G/M2 (ref 43–95)
ECHO LV POSTERIOR WALL DIASTOLIC: 1.1 CM (ref 0.6–0.9)
ECHO LV RELATIVE WALL THICKNESS RATIO: 0.47
ECHO LVOT AREA: 4.5 CM2
ECHO LVOT DIAM: 2.4 CM
ECHO LVOT PEAK GRADIENT: 4 MMHG
ECHO LVOT PEAK VELOCITY: 1 M/S
ECHO MV A VELOCITY: 0.62 M/S
ECHO MV AREA PHT: 3.2 CM2
ECHO MV E DECELERATION TIME (DT): 236.6 MS
ECHO MV E VELOCITY: 0.65 M/S
ECHO MV E/A RATIO: 1.05
ECHO MV E/E' LATERAL: 7.22
ECHO MV E/E' RATIO (AVERAGED): 8.25
ECHO MV E/E' SEPTAL: 9.29
ECHO MV PRESSURE HALF TIME (PHT): 68.6 MS
ECHO PULMONARY ARTERY END DIASTOLIC PRESSURE: 6 MMHG
ECHO PV MAX VELOCITY: 1 M/S
ECHO PV PEAK GRADIENT: 4 MMHG
ECHO PV REGURGITANT MAX VELOCITY: 1.3 M/S
ECHO RIGHT VENTRICULAR SYSTOLIC PRESSURE (RVSP): 29 MMHG
ECHO RV FREE WALL PEAK S': 13 CM/S
ECHO RV INTERNAL DIMENSION: 5.4 CM
ECHO RV TAPSE: 2.4 CM (ref 1.5–2)
ECHO TV REGURGITANT MAX VELOCITY: 2.55 M/S
ECHO TV REGURGITANT PEAK GRADIENT: 26 MMHG
P-R INTERVAL, ECG05: 124 MS
Q-T INTERVAL, ECG07: 315 MS
QRS DURATION, ECG06: 99 MS
QTC CALCULATION (BEZET), ECG08: 467 MS
TSH SERPL DL<=0.05 MIU/L-ACNC: 0.61 UIU/ML (ref 0.36–3.74)
VENTRICULAR RATE, ECG03: 132 BPM

## 2022-01-17 PROCEDURE — 93306 TTE W/DOPPLER COMPLETE: CPT | Performed by: INTERNAL MEDICINE

## 2022-01-17 PROCEDURE — 93306 TTE W/DOPPLER COMPLETE: CPT

## 2022-01-17 PROCEDURE — 74011250637 HC RX REV CODE- 250/637: Performed by: INTERNAL MEDICINE

## 2022-01-17 PROCEDURE — 36415 COLL VENOUS BLD VENIPUNCTURE: CPT

## 2022-01-17 PROCEDURE — 74011250637 HC RX REV CODE- 250/637: Performed by: NURSE PRACTITIONER

## 2022-01-17 PROCEDURE — 84443 ASSAY THYROID STIM HORMONE: CPT

## 2022-01-17 PROCEDURE — 74011250637 HC RX REV CODE- 250/637: Performed by: HOSPITALIST

## 2022-01-17 PROCEDURE — 74011250636 HC RX REV CODE- 250/636: Performed by: HOSPITALIST

## 2022-01-17 PROCEDURE — 99233 SBSQ HOSP IP/OBS HIGH 50: CPT | Performed by: INTERNAL MEDICINE

## 2022-01-17 PROCEDURE — 74011000250 HC RX REV CODE- 250: Performed by: HOSPITALIST

## 2022-01-17 RX ORDER — METOPROLOL SUCCINATE 100 MG/1
100 TABLET, EXTENDED RELEASE ORAL DAILY
Qty: 30 TABLET | Refills: 0 | Status: SHIPPED | OUTPATIENT
Start: 2022-01-17 | End: 2022-02-16

## 2022-01-17 RX ORDER — DILTIAZEM HYDROCHLORIDE 120 MG/1
120 CAPSULE, COATED, EXTENDED RELEASE ORAL DAILY
Status: DISCONTINUED | OUTPATIENT
Start: 2022-01-17 | End: 2022-01-17 | Stop reason: HOSPADM

## 2022-01-17 RX ORDER — DILTIAZEM HYDROCHLORIDE 120 MG/1
120 CAPSULE, COATED, EXTENDED RELEASE ORAL DAILY
Qty: 30 CAPSULE | Refills: 0 | Status: SHIPPED | OUTPATIENT
Start: 2022-01-17 | End: 2022-02-16

## 2022-01-17 RX ORDER — METOPROLOL SUCCINATE 25 MG/1
50 TABLET, EXTENDED RELEASE ORAL ONCE
Status: COMPLETED | OUTPATIENT
Start: 2022-01-17 | End: 2022-01-17

## 2022-01-17 RX ORDER — METOPROLOL SUCCINATE 25 MG/1
100 TABLET, EXTENDED RELEASE ORAL DAILY
Status: DISCONTINUED | OUTPATIENT
Start: 2022-01-17 | End: 2022-01-17 | Stop reason: HOSPADM

## 2022-01-17 RX ADMIN — DILTIAZEM HYDROCHLORIDE 60 MG: 30 TABLET, FILM COATED ORAL at 06:53

## 2022-01-17 RX ADMIN — Medication 10 ML: at 06:53

## 2022-01-17 RX ADMIN — ENOXAPARIN SODIUM 100 MG: 100 INJECTION SUBCUTANEOUS at 03:46

## 2022-01-17 RX ADMIN — METOPROLOL TARTRATE 50 MG: 50 TABLET, FILM COATED ORAL at 08:26

## 2022-01-17 RX ADMIN — ACETAMINOPHEN 650 MG: 325 TABLET ORAL at 17:06

## 2022-01-17 RX ADMIN — DILTIAZEM HYDROCHLORIDE 120 MG: 120 CAPSULE, COATED, EXTENDED RELEASE ORAL at 12:18

## 2022-01-17 RX ADMIN — Medication 10 ML: at 00:12

## 2022-01-17 RX ADMIN — METOPROLOL SUCCINATE 50 MG: 25 TABLET, FILM COATED, EXTENDED RELEASE ORAL at 16:33

## 2022-01-17 RX ADMIN — APIXABAN 5 MG: 5 TABLET, FILM COATED ORAL at 12:18

## 2022-01-17 NOTE — DISCHARGE INSTRUCTIONS
Please call PCP for cardiologist referral in [facility] to follow up but if you want to be seen in [place], please call Dr Kimberley Pop office as listed below:    Dr. Jose Gaona M.D. [facility]  Electrophysiology/Cardiology  [facility] [place]  [address] [place] [address] Avenue                              [phone]                                             Discharge Instructions       PATIENT ID: Ramsey Kern  MRN: [id number]   YOB: 1960    DATE OF ADMISSION: 1/16/2022  1:11 PM    DATE OF DISCHARGE: 1/17/2022    PRIMARY CARE PROVIDER: Chano Kaba MD     ATTENDING PHYSICIAN: Denys Garza MD  DISCHARGING PROVIDER: Madelyn Loving MD    To contact this individual call [phone] and ask the  to page. If unavailable ask to be transferred the Adult Hospitalist Department. DISCHARGE DIAGNOSES A fib RVR    CONSULTATIONS: IP CONSULT TO CARDIOLOGY    PROCEDURES/SURGERIES: * No surgery found *    PENDING TEST RESULTS:   At the time of discharge the following test results are still pending:     FOLLOW UP APPOINTMENTS:   Follow-up Information     Follow up With Specialties Details Why Contact Info    Chano Kaba MD Family Medicine In 1 week  [address] [facility] [address] [phone]             ADDITIONAL CARE RECOMMENDATIONS:     DIET: Cardiac Diet    ACTIVITY: Activity as tolerated    WOUND CARE:     EQUIPMENT needed:       Radiology      DISCHARGE MEDICATIONS:   See Medication Reconciliation Form    · It is important that you take the medication exactly as they are prescribed. · Keep your medication in the bottles provided by the pharmacist and keep a list of the medication names, dosages, and times to be taken in your wallet. · Do not take other medications without consulting your doctor. NOTIFY YOUR PHYSICIAN FOR ANY OF THE FOLLOWING:   Fever over 101 degrees for 24 hours.    Chest pain, shortness of breath, fever, chills, nausea, vomiting, diarrhea, change in mentation, falling, weakness, bleeding. Severe pain or pain not relieved by medications. Or, any other signs or symptoms that you may have questions about.       DISPOSITION:  x  Home With:   OT  PT  HH  RN       SNF/Inpatient Rehab/LTAC    Independent/assisted living    Hospice    Other:     CDMP Checked:   Yes x     PROBLEM LIST Updated:  Yes x       Signed:   Makenzie Quintero MD  1/17/2022  11:26 AM

## 2022-01-17 NOTE — PROGRESS NOTES
BLACK:  Anticipate discharge home with family assistance pending medical progress. Transportation in car with . Reason for Admission: Rapid aFib                   RUR Score:  8%                   Plan for utilizing home health:  N/A        PCP: First and Last name:  Mitesh Hawkins MD     Name of Practice:    Are you a current patient: Yes/No: yes   Approximate date of last visit: December 2021   Can you participate in a virtual visit with your PCP:                     Current Advanced Directive/Advance Care Plan: Full Code    Healthcare Decision Maker:   Click here to complete [address] including selection of the Healthcare Decision Maker Relationship (ie \"Primary\")                         Transition of Care Plan:  CM met with patient and her  at bedside. Patient is alert and oriented x4. Patient resides with her  in a one level home with 3 steps to enter. DME: cane, rollator. Hx: HTN, GERD, bilateral knee replacements. Patient reported being Covid vaccinated. PCP confirmed and pharmacy used is [facility] located at [address] [place] [address], [facility] phone: [phone]. No barriers to discharge noted at this time. Emergency contact: Vianneythelma Chavarria, , [phone]    Care Management Interventions  PCP Verified by CM: Yes (Dr. Samuel Washington)  Mode of Transport at Discharge:  Other (see comment) (in car with )  [address] Huang[address] Segura: No  Discharge Durable Medical Equipment: No  Physical Therapy Consult: No  Occupational Therapy Consult: No  Speech Therapy Consult: No  Support Systems: Spouse/Significant Other  Confirm Follow Up Transport: Family  The Plan for Transition of Care is Related to the Following Treatment Goals : home with family assistance  Discharge Location  Discharge Placement: Home with family assistance     Lisa Forrester, [address] [place] [address],[address]  [phone]

## 2022-01-17 NOTE — PROGRESS NOTES
Attempted to call nurse back regarding page, but no response after being on hold for several minutes. Patient has already received metoprolol tartrate 50 mg po this AM.  Give Toprol XL 50 mg po today, then start Toprol  mg po daily tomorrow.

## 2022-01-17 NOTE — PROGRESS NOTES
Cardiac Electrophysiology [facility] Progress Note   REFERRING PROVIDER: Dr Magnus Beltre  Subjective:      Silvia Frey is a 64 y.o. patient who is seen for evaluation of atrial fibrillation RVR   She has converted and maintain NSR  She had been given IV cardizem    ECG in ER was AFIB 132 bpm   [facility] was full  She had this suddenly and first time she has been told  No family hx of AF  She had echo done years ago in a cardiologist's office, cannot remember name or why  No chest pain or BRYANT recently  Chest xray reported cardiomegaly   Hs troponin was normal at 11  She takes med for hypertension   No ischemia on ECG             Patient Active Problem List   Diagnosis Code    Rapid atrial fibrillation ([facility] [address] I48.91                Current Facility-Administered Medications   Medication Dose Route Frequency Provider Last Rate Last Admin    metoprolol tartrate (LOPRESSOR) tablet 50 mg  50 mg Oral Q12H Marni Currie MD   50 mg at 01/16/22 1430    sodium chloride (NS) flush 5-40 mL  5-40 mL IntraVENous Q8H Marni Currie MD        sodium chloride (NS) flush 5-40 mL  5-40 mL IntraVENous PRN Marni Currie MD        acetaminophen (TYLENOL) tablet 650 mg  650 mg Oral Q6H PRN Marni Currie MD         Or    acetaminophen (TYLENOL) suppository 650 mg  650 mg Rectal Q6H PRN Marni Currie MD        polyethylene glycol (MIRALAX) packet 17 g  17 g Oral DAILY PRN Marni Currie MD        ondansetron (ZOFRAN ODT) tablet 4 mg  4 mg Oral Q8H PRN Marni Currie MD         Or    ondansetron [facility]) injection 4 mg  4 mg IntraVENous Q6H PRN Nirali Ybarra MD        enoxaparin (LOVENOX) injection 100 mg  1 mg/kg SubCUTAneous Q12H Nirali Ybarra MD        dilTIAZem IR (CARDIZEM) tablet 60 mg  60 mg Oral Saloni Suarez MD   60 mg at 01/16/22 1509             Current Outpatient Medications   Medication Sig Dispense Refill    hydroCHLOROthiazide (HYDRODIURIL) 12.5 mg tablet Take 12.5 mg by mouth daily.        amLODIPine (Norvasc) 5 mg tablet Take 5 mg by mouth daily.        metoprolol succinate (TOPROL-XL) 50 mg XL tablet Take  by mouth daily.          Allergies   Allergen Reactions    Lisinopril Angioedema           Past Medical History:   Diagnosis Date    GERD (gastroesophageal reflux disease)      Hypertension        Past surgical history: right knee replacement    No family history of AF  Social History           Tobacco Use    Smoking status: Never Smoker    Smokeless tobacco: Never Used   Substance Use Topics    Alcohol use: Not Currently         Review of Systems:   Constitutional: Negative for fever, chills, weight loss, malaise/fatigue. HEENT: Negative for nosebleeds, vision changes. Respiratory: Negative for cough, hemoptysis  Cardiovascular: Negative for chest pain, + palpitations when she had PAF, no orthopnea, claudication, leg swelling, syncope, and PND. Gastrointestinal: Negative for nausea, vomiting, diarrhea, blood in stool and melena. Genitourinary: Negative for dysuria, and hematuria. Musculoskeletal: Negative for myalgias, arthralgia. Skin: Negative for rash. Heme: Does not bleed or bruise easily. Neurological: Negative for speech change and focal weakness     Objective:      Visit Vitals  /72   Pulse 85   Temp 99.1 °F (37.3 °C)   Resp 19   Ht 5' 4\" (1.626 m)   Wt 206 lb 12.7 oz (93.8 kg)   SpO2 97%   BMI 35.50 kg/m²          Physical Exam:   Constitutional: well-developed and well-nourished. No respiratory distress. Head: Normocephalic and atraumatic. Eyes: Pupils are equal, round  ENT: hearing normal  Neck: supple. No JVD present. Cardiovascular: Normal rate, regular rhythm. Exam reveals no gallop and no friction rub. No murmur heard. Pulmonary/Chest: Effort normal and breath sounds normal. No wheezes. Abdominal: Soft, no tenderness. Mild obesity  Musculoskeletal: no edema. Neurological: alert,oriented. Skin: Skin is warm and dry  Psychiatric: normal mood and affect.  Behavior is normal. Judgment and thought content normal.          Assessment/Plan:   PAF RVR  Hypertension   cardiomegaly     Agree with changing norvasc and cardizem IV to beta blocker. Get echocardiogram as she has enlarged heart reported on chest ray and there is no obvious trigger  She has hypertension as risk factor along with gender so CHADS 2 vasc score now is 2. If echo is abnormal she will need cardiac cath or stress test  She will need eliquis 5 mg bid when going home   She did not like coumadin. She took it after knee replacement to prevent DVT  She plans to ask her PCP in [facility] for a cardiologist to see locally  I will leave my contact on discharge in case she wants to come back to [place] for follow up        Thank you for involving me in this patient's care and please call with further concerns or questions.        Stacey Chung M.D.   Electrophysiology/Cardiology  [facility]  [address] [place] [address]                                [phone]

## 2022-01-17 NOTE — PROGRESS NOTES
Bedside and Verbal shift change report given to [id number] Md, Dr (oncoming nurse) by Arun Li (offgoing nurse).  Report included the following information SBAR, Kardex, ED Summary, Procedure Summary, Intake/Output, MAR, Recent Results and Cardiac Rhythm SR.

## 2022-01-17 NOTE — PROGRESS NOTES
11:16 RN perfect served cardiology regarding Pt receiving 100mg XL metoprolol in addition to receiving 50mg PO metoprolol this morning. No message yet received. 12:06 RN called pharmacy and per pharmacy recs, Pt is clear to have the 100mg XL metoprolol in addition to 50mg already taken this morning if Pt's HR remains 70s or greater.

## 2022-01-17 NOTE — ADT AUTH CERT NOTES
[address]     FACILITY NPI :[id number]  FACILITY TAX ID :      [facility]  [facility] [address] [phone]          DEPT CONTACT:  Missael YOUNG#[phone]  FYE#[phone]       Patient Name :Gabe Ramos   : 1960 (64 yrs)  MRN : [id number]     Patient Mailing Address [address] [place] [address] [47] , [id number]                  Insurance Plan Payor: Aiden Nichols / Plan: [address] [place] [address] / Product Type: PPO /      Primary Coverage Subscriber ID : [id number]     Secondary Coverage:  N/A      Current Patient Class : INPATIENT  Admit Date : 2022     REQUESTED LEVEL OF CARE: INPATIENT [101]                                                           Diagnosis : Rapid atrial fibrillation ([facility] [address]                          ICD10 Code : Rapid atrial fibrillation [facility]) [I48.91]     Current Room and Bed 443/02     Admitting and Attending Info:  Admitting Provider : Gómez Lopez MD   NPI: [id number]  Admitting Provider Phone.  [phone]  Admitting Provider Address: [address]     Attending Provider Samson Cabrera MD   [id number]  Attending Provider Address:  [address] [place] [address]     Attending Provider Phone: Attending phys phone: [phone]          Utilization Reviews         Atrial Fibrillation - Care Day 2 (2022) by Mati Shane       Review Entered Review Status   2022 14:03 Completed      Criteria Review      Care Day: 2 Care Date: 2022 Level of Care: Telemetry    Guideline Day 2    Clinical Status    (X) * Hemodynamic stability    2022 14:03:21 EST by Mati Shane      VSS    (X) * Sinus rhythm or acceptable ventricular rate    2022 14:03:21 EST by Deann Potter      SR    ( ) * No evidence of myocardial ischemia    (X) * Mental status at baseline    1/17/2022 14:03:21 EST by Deann Potter      LOC alert    (X) * Tachypnea absent    1/17/2022 14:03:21 EST by Deann Potter      RR 18    (X) * Hypoxemia absent    1/17/2022 14:03:21 EST by Deann Potter      93% on RA    ( ) * Anticoagulants regimen for next level of care established    1/17/2022 14:03:21 EST by Deann Potter      Eliquis 5mg PO BID  Lovenox 100mg SC q12    ( ) * Antiarrhythmic medication absent or no requirement for further inpatient ECG monitoring    1/17/2022 14:03:21 EST by Deann Potter      Diltiazem IR 60mg PO TID before meals    ( ) * Discharge plans and education understood    Activity    (X) * Ambulatory or acceptable for next level of care    1/17/2022 14:03:21 EST by Deann Potter      activity as tolerated with assist    Routes    (X) * Oral hydration    1/17/2022 14:03:21 EST by Deann Potter      adult regular diet 2gNa    (X) * Oral medications or regimen acceptable for next level of care    1/17/2022 14:03:21 EST by Deann Potter      PO meds    (X) * Oral diet or acceptable for next level of care    1/17/2022 14:03:21 EST by Deann Potter      adult regular diet 2gNa    Interventions    (X) Cardiac monitoring    1/17/2022 14:03:21 EST by Deann Potter      cardiac monitoring    Medications    (X) Anticoagulants    1/17/2022 14:03:21 EST by Deann Potter      Eliquis 5mg PO BID  Lovenox 100mg SC q12    (X) Possible rate and rhythm control medications    1/17/2022 14:03:21 EST by Deann Potter      Diltiazem IR 60mg PO TID before meals    * Milestone   Additional Notes   Date of care: 1/17/2022      IP-LOC-Telemetry      Cardiac EP PN: Assessment/Plan:   PAF RVR   Hypertension    cardiomegaly   Agree with changing norvasc and cardizem IV to beta blocker.     Get echocardiogram as she has enlarged heart reported on chest ray and there is no obvious trigger   She has hypertension as risk factor along with gender so CHADS 2 vasc score now is 2.     If echo is abnormal she will need cardiac cath or stress test   She will need eliquis 5 mg bid when going home    She did not like coumadin.  She took it after knee replacement to prevent DVT   She plans to ask her PCP in [facility] for a cardiologist to see locally   I will leave my contact on discharge in case she wants to come back to [place] for follow up      Vitals: Temp 99.1, HR 86, /73, RR 18, 93% on RA      Labs:   Echo: pending      Medications:   Eliquis 5mg PO BID   Diltiazem ER 120mg PO daily   Metoprolol XL 100mg PO daily   Metoprolol XL 50mg PO x1   Diltiazem IR 60mg PO TID before meals   Lovenox 100mg SC q12   Metoprolol 50mg PO q12      Plan: adult regular diet 2gNa, activity as tolerated with assist, cardiac monitoring, echo                                                   Atrial Fibrillation - Care Day 1 (1/16/2022) by Danis Solano       Review Entered Review Status   1/17/2022 09:21 Completed      Criteria Review      Care Day: 1 Care Date: 1/16/2022 Level of Care: Telemetry    Guideline Day 1    Level Of Care    (X) ICU, intermediate care, or telemetry    1/17/2022 09:21:33 EST by Danis Solano      telemetry    Clinical Status    (X) * Clinical Indications met    1/17/2022 09:21:33 EST by Danis Solano      met    (X) Tachycardia with possible chest pain, palpitation, dyspnea    1/17/2022 09:21:33 EST by Chaparrita Marie Patient awakened at 7 AM with rapid heart rate and palpitations    Activity    (X) Activity as tolerated    1/17/2022 09:21:33 EST by Danis Solano      activity as tolerated with assist    Routes    (X) Diet as tolerated    1/17/2022 09:21:33 EST by Danis Solano      adult regular diet 2gNa    (X) Parenteral medications    1/17/2022 09:21:33 EST by Danis Solano      IV meds Interventions    (X) Cardiac monitoring    1/17/2022 09:21:33 EST by Esther Paniagua      cardiac monitoring    Medications    (X) Anticoagulants    1/17/2022 09:21:33 EST by Esther Paniagua      Lovenox 100mg SC q12    (X) Medication for ventricular rate control    1/17/2022 09:21:33 EST by Esther Paniagua      Diltiazem IV titrate  Diltiazem IR 60mg PO TID before meals    * Milestone   Additional Notes   Date of care: 1/16/2022      IP-LOC-Telemetry      Hospitalist note: 64 y.o. female who presents with palpipation    Patient arrives as transfer from [facility]    PMH of HTN, GERD    Patient awakened at 7 AM with rapid heart rate and palpitations. She denies chest pain, shortness of breath, nausea or diaphoresis. She denies any cardiac history. She was started with cardizem drip in [place] and transferred to [facility] ER. One hour later in ER, noticed converted to Sinus   General: Alert x oriented x 3, awake, no acute distress   HEENT: PEERL, EOMI, moist mucus membranes   Neck: Supple, no JVD, no meningeal signs   Chest: Clear to auscultation bilaterally    CVS: RRR, S1 S2 heard, no murmurs/rubs/gallops   Abd: Soft, non-tender, non-distended, +bowel sounds    Ext: No clubbing, no cyanosis, no edema   Neuro/Psych: Pleasant mood and affect,    Cap refill: Brisk, less than 3 seconds   Pulses: 2+, symmetric in all extremities   Skin: Warm, dry, without rashes or lesions   Assessment:   Given the patient's current clinical presentation, there is a high level of concern for decompensation if discharged from the emergency department. Complex decision making was performed, which includes reviewing the patient's available past medical records, laboratory results, and imaging studies. Active Problems:     Rapid atrial fibrillation ([address] (1/16/2022)   Plan:   1. Rapid afib: new onset, response to cardizem drip, converted to sinus now. Will start po cardizem 60mg q8h and wean off cardizem drip.    Start her home metoprolol . Echo. TSH, cardilogist consult    2. HTN: start metoprolol. Hold home HCTZ and amilodipine, bp meds will further be adjusted       Cardiac EP consult: Assessment/Plan:   PAF RVR   Hypertension    cardiomegaly   Agree with changing norvasc and cardizem IV to beta blocker.     Get echocardiogram as she has enlarged heart reported on chest ray and there is no obvious trigger   She has hypertension as risk factor along with gender so CHADS 2 vasc score now is 2.     If echo is abnormal she will need cardiac cath or stress test   She will need NOAC when going home    She did not like coumadin.  She took it after knee replacement to prevent DVT      Vitals: Temp 98.9, HR 85, /78, RR 25, 22, 22, 92% on RA      Labs:   1/16/2022 07:35   MCV: 74.0 (L)   MCH: 24.5 (L)   RDW: 15.9 (H)   NEUTROPHILS: 36 (L)   LYMPHOCYTES: 55 (H)   ABS. LYMPHOCYTES: 5.1 (H)   Glucose: 122 (H)   BUN/Creatinine ratio: 21 (H)   NT pro-BNP: 140 (H)   EKG:    Atrial fibrillation    RSR' in V1 or V2, right VCD or RVH    Borderline ST depression, lateral leads   Chest x-ray: IMPRESSION   Unchanged cardiomegaly.  Possible trace right pleural effusion   Covid-19/Influenza A/B: negative      Medications:   Metoprolol 50mg PO q12      Plan: adult regular diet 2gNa, activity as tolerated with assist, cardiac monitoring                    Atrial Fibrillation - Clinical Indications for Admission to Inpatient Care by Jocelyn Contreras       Review Entered Review Status   1/17/2022 09:20 Completed      Criteria Review      Clinical Indications for Admission to Inpatient Care    Most Recent : Jocelyn Contreras Most Recent Date: 1/17/2022 09:20:16 EST    (X) Admission is indicated for  1 or more  of the following  (1) (2) (3) (4) (5) (6):       (X) Initiation or adjustment of antiarrhythmic medication [A] that requires cardiac monitoring       (eg, telemetry), as indicated by  ALL  of the following :          (X) Cardiac monitoring (eg, telemetry) appropriate, as indicated by  1 or more  of the following          :             (X) Need for treatment with antiarrhythmic medication regimen that has significant proarrhythmic             potential (eg, monomorphic VT, torsades de pointes (TdP), bradycardia) [C] [D]             1/17/2022 09:20:16 EST by Derrick Lee               awakened at 7 AM with rapid heart rate and palpitations  She was started with cardizem drip in [place] and transferred to [facility] ER.          (X) Cardiac monitoring required that extends beyond observation care time frame [D]          1/17/2022 09:20:16 EST by Derrick Lee            Rapid afib: new onset, response to cardizem drip, converted to sinus now. Will start po cardizem 60mg q8h and wean off cardizem drip. Additional Notes   ED provider note: Patient arrives as transfer from [facility] for afib rvr. Rate controlled on cardizem drip. No other complaints. 64 y.o. female who comes in as above. Patient stable on cardizem drip. Hospitalist notified   Other: Anup Paniagua from [facility] for afib rvr. Stable. On cardizem. H&P Notes       H&P by John Abraham MD at 01/16/22 1441 documented on ED to Hosp-Admission (Current) from 1/16/2022 in [address] [place] [address]    Author: John Abraham MD Author Type: Physician Filed: 01/16/22 6680   Note Status: Signed Cosign: Cosign Not Required Date of Service: 01/16/22 1441   : John Abraham MD (Physician)                    [facility] Adult  Hospitalist Group  History and Physical     Date of Service:  1/16/2022  Primary Care Provider: Howard Liu MD  Source of information: The patient     Chief Complaint: Irregular Heart Beat            History of Presenting Illness:   Benedict Evans is a 64 y.o. female who presents with palpipation      [facility]   Patient awakened at 7 AM with rapid heart rate and palpitations.  She denies chest pain, shortness of breath, nausea or diaphoresis. She denies any cardiac history. She was started with cardizem drip in [place] and transferred to [facility] ER. One hour later in ER, noticed converted to Sinus        REVIEW OF SYSTEMS:  General: hpi, no fever,no changes of weight  HEENT: no headache, no vision changes, no nose discharge, no hearing changes   RES: no wheezing, no cough, no sob  CVS: HPI   Muscular: no joint swelling, no muscle pain, no leg swelling  Skin: no rash, no itching   GI: no vomiting, no diarrhea  : no dysuria, no hematuria  Hemo: no gum bleeding, no petechial   Neuro: no sensation changes, no focal weakness   Endo: no polydipsia   Psych: denied depression           Past Medical History:   Diagnosis Date    GERD (gastroesophageal reflux disease)      Hypertension        No past surgical history on file. Prior to Admission medications    Medication Sig Start Date End Date Taking? Authorizing Provider   hydroCHLOROthiazide (HYDRODIURIL) 12.5 mg tablet Take 12.5 mg by mouth daily.     Yes Provider, Historical   amLODIPine (Norvasc) 5 mg tablet Take 5 mg by mouth daily.     Yes Provider, Historical   metoprolol succinate (TOPROL-XL) 50 mg XL tablet Take  by mouth daily.     Yes Provider, Historical           Allergies   Allergen Reactions    Lisinopril Angioedema      No family history on file. no DM, no cad  Social History:  reports that she has never smoked. She has never used smokeless tobacco. She reports previous alcohol use.  She reports that she does not use drugs.      Family and social history were personally reviewed, all pertinent and relevant details are outlined as above.     Objective:      Visit Vitals  BP (!) 110/54   Pulse 74   Temp 98.8 °F (37.1 °C)   Resp 18   Ht 5' 4\" (1.626 m)   Wt 96.2 kg (212 lb 1.3 oz)   SpO2 92%   BMI 36.40 kg/m²      O2 Device: None (Room air)     PHYSICAL EXAM:    General: Alert x oriented x 3, awake, no acute distress  HEENT: PEERL, EOMI, moist mucus membranes  Neck: Supple, no JVD, no meningeal signs  Chest: Clear to auscultation bilaterally   CVS: RRR, S1 S2 heard, no murmurs/rubs/gallops  Abd: Soft, non-tender, non-distended, +bowel sounds   Ext: No clubbing, no cyanosis, no edema  Neuro/Psych: Pleasant mood and affect,   Cap refill: Brisk, less than 3 seconds  Pulses: 2+, symmetric in all extremities  Skin: Warm, dry, without rashes or lesions     Data Review: All diagnostic labs and studies have been reviewed.     Abnormal Labs Reviewed - No data to display         All Micro Results      None             IMAGING:   No orders to display      XR CHEST PORT     Result Date: 1/16/2022  Unchanged cardiomegaly. Possible trace right pleural effusion.     ECG/ECHO:           Results for orders placed or performed during the hospital encounter of 01/16/22   EKG, 12 LEAD, INITIAL   Result Value Ref Range     Ventricular Rate 132 BPM     Atrial Rate 122 BPM     P-R Interval 124 ms     QRS Duration 99 ms     Q-T Interval 315 ms     QTC Calculation (Bezet) 467 ms     Calculated R Axis -4 degrees     Calculated T Axis 16 degrees     Diagnosis           Atrial fibrillation  RSR' in V1 or V2, right VCD or RVH  Borderline ST depression, lateral leads            Assessment:   Given the patient's current clinical presentation, there is a high level of concern for decompensation if discharged from the emergency department. Complex decision making was performed, which includes reviewing the patient's available past medical records, laboratory results, and imaging studies.     Active Problems:    Rapid atrial fibrillation ([address] (1/16/2022)        Plan:   1. Rapid afib: new onset, response to cardizem drip, converted to sinus now. Will start po cardizem 60mg q8h and wean off cardizem drip. Start her home metoprolol . Echo. TSH, cardilogist consult   2. HTN: start metoprolol. Hold home HCTZ and amilodipine, bp meds will further be adjusted      BMI: Body mass index is 36.4 kg/m². . This patient: Meets criteria for obesity given BMI >/= 30 and < 40 due to excess calories/nutritional. Weight loss and lifestyle modifications should be encouraged.     DIET: ADULT DIET Regular; Low Sodium (2 gm)   ISOLATION PRECAUTIONS: There are currently no Active Isolations  CODE STATUS: Full Code   DVT PROPHYLAXIS: Lovenox  FUNCTIONAL STATUS PRIOR TO HOSPITALIZATION: Fully active and ambulatory; able to carry on all self-care without restriction. EARLY MOBILITY ASSESSMENT: Recommend routine ambulation while hospitalized with the assistance of nursing staff  ANTICIPATED DISCHARGE: 24-48 hours.   EMERGENCY CONTACT/SURROGATE DECISION MAKER: none      CRITICAL CARE WAS PERFORMED FOR THIS ENCOUNTER: NO.        Signed By: Nancy Turcios MD      January 16, 2022

## 2022-01-17 NOTE — ROUTINE PROCESS
TRANSFER - OUT REPORT:    Verbal report given to lanie thornton(name) on Juan Pablo Mendoza  being transferred to [address] [place] [address](unit) for routine progression of care       Report consisted of patients Situation, Background, Assessment and   Recommendations(SBAR). Information from the following report(s) SBAR, Kardex, ED Summary and MAR was reviewed with the receiving nurse. Lines:   Peripheral IV 01/16/22 Left Antecubital (Active)   Site Assessment Clean, dry, & intact 01/16/22 1327   Phlebitis Assessment 0 01/16/22 1327   Infiltration Assessment 0 01/16/22 1327   Dressing Status Clean, dry, & intact 01/16/22 1327   Dressing Type Tape;Transparent 01/16/22 0738   Hub Color/Line Status Patent; Flushed;Pink 01/16/22 0738       Peripheral IV 01/16/22 Anterior;Right Forearm (Active)        Opportunity for questions and clarification was provided.       Patient transported with:   Registered Nurse

## 2022-01-18 NOTE — DISCHARGE SUMMARY
Discharge Summary       PATIENT ID: Richard Faria  MRN: [id number]   YOB: 1960    DATE OF ADMISSION: 1/16/2022  1:11 PM    DATE OF DISCHARGE: 1/17/22   PRIMARY CARE PROVIDER: Carmen Gifford MD     ATTENDING PHYSICIAN: Khari Hearn  DISCHARGING PROVIDER: Steve Monique MD    To contact this individual call [phone] and ask the  to page. If unavailable ask to be transferred the Adult Hospitalist Department. CONSULTATIONS: IP CONSULT TO CARDIOLOGY    PROCEDURES/SURGERIES: * No surgery found *    DISCHARGE DIAGNOSES:   Atrial fibrillation    ADMISSION SUMMARY AND HOSPITAL COURSE:   Patient was seen by cardiology medication adjusted patient discharged on Cardizem and Eliquis and metoprolol follow-up with cardiology at patient's convenience location at [facility] follow-up with PCP recommended prescription sent to [facility] pharmacy of patient's choice echocardiogram done normal report    DISCHARGE DIAGNOSES / PLAN:      Discharge home    BMI: Body mass index is 35.36 kg/m². . This patient: Has a BMI within normal limits. PENDING TEST RESULTS:   At the time of discharge the following test results are still pending:      ADDITIONAL CARE RECOMMENDATIONS:        NOTIFY YOUR PHYSICIAN FOR ANY OF THE FOLLOWING:   Fever over 101 degrees for 24 hours. Chest pain, shortness of breath, fever, chills, nausea, vomiting, diarrhea, change in mentation, falling, weakness, bleeding. Severe pain or pain not relieved by medications, as well as any other signs or symptoms that you may have questions about.     FOLLOW UP APPOINTMENTS:    Follow-up Information     Follow up With Specialties Details Why Contact Info    Carmen Gifford MD Family Medicine In 1 week  [address] [place]thia Jain [phone]               DIET: Cardiac Diet    ACTIVITY: Activity as tolerated    EQUIPMENT needed:     DISCHARGE MEDICATIONS:  Discharge Medication List as of 1/17/2022  4:52 PM      START taking these medications    Details   apixaban (ELIQUIS) 5 mg tablet Take 1 Tablet by mouth two (2) times a day for 30 days. Indications: treatment to prevent blood clots in chronic atrial fibrillation, Normal, Disp-60 Tablet, R-0      dilTIAZem ER (CARDIZEM CD) 120 mg capsule Take 1 Capsule by mouth daily for 30 days. Indications: high blood pressure, ventricular rate control in atrial fibrillation, Normal, Disp-30 Capsule, R-0         CONTINUE these medications which have CHANGED    Details   metoprolol succinate (TOPROL-XL) 100 mg tablet Take 1 Tablet by mouth daily for 30 days. Indications: high blood pressure, ventricular rate control in atrial fibrillation, Normal, Disp-30 Tablet, R-0         CONTINUE these medications which have NOT CHANGED    Details   ferrous sulfate 325 mg (65 mg iron) tablet Take 325 mg by mouth Daily (before breakfast). , Historical Med      omeprazole (PRILOSEC) 20 mg capsule Take 20 mg by mouth daily. , Historical Med      olopatadine (PATANOL) 0.1 % ophthalmic solution Administer 1 Drop to both eyes two (2) times a day., Historical Med         STOP taking these medications       hydroCHLOROthiazide (HYDRODIURIL) 12.5 mg tablet Comments:   Reason for Stopping:         amLODIPine (Norvasc) 5 mg tablet Comments:   Reason for Stopping:               DISPOSITION:   x Home With:   OT  PT  HH  RN       Long term SNF/Inpatient Rehab    Independent/assisted living    Hospice    Other:       PATIENT CONDITION AT DISCHARGE:     Functional status    Poor     Deconditioned    x Independent      Cognition   x  Lucid     Forgetful     Dementia      Catheters/lines (plus indication)    [place]     PICC     PEG    x None      Code status    x Full code     DNR      PHYSICAL EXAMINATION AT DISCHARGE:  General:          Alert, cooperative, no distress, appears stated age.      HEENT:           Atraumatic, anicteric sclerae, pink conjunctivae                          No oral ulcers, mucosa moist, throat clear, dentition fair  Neck:               Supple, symmetrical  Lungs:             Clear to auscultation bilaterally. No Wheezing or Rhonchi. No rales. Chest wall:      No tenderness  No Accessory muscle use. Heart:              Regular  rhythm,  No  murmur   No edema  Abdomen:        Soft, non-tender. Not distended. Bowel sounds normal  Extremities:     No cyanosis. No clubbing,                            Skin turgor normal, Capillary refill normal  Skin:                Not pale. Not Jaundiced  No rashes   Psych:             Not anxious or agitated.   Neurologic:      Alert, moves all extremities, answers questions appropriately and responds to commands       CHRONIC MEDICAL DIAGNOSES:  Problem List as of 1/17/2022 Never Reviewed          Codes Class Noted - Resolved    RESOLVED: Rapid atrial fibrillation [facility]) ICD-10-CM: I48.91  ICD-9-CM: 427.31  1/16/2022 - 1/17/2022              Greater than 30  minutes were spent with the patient on counseling and coordination of care    Signed:   Cathryn Cruz MD  1/18/2022  7:40 AM

## 2022-01-19 NOTE — PROGRESS NOTES
Physician Progress Note      PATIENT:               Julene Boeck  CSN #:                  [id number]  :                       1960  ADMIT DATE:       2022 1:11 PM  Renaldo Pa DATE:        2022 6:35 PM  RESPONDING  PROVIDER #:        Merlin Barrett MD Janit Pines MD          QUERY TEXT:    Patient admitted with paroxysmal atrial fibrillation and is maintained on Lovenox during the admission and discharged on Eliquis. Patient noted to have a [id number] score of 2 and HTN. If possible, please document in progress notes and discharge summary if you are evaluating and/or treating any of the following: The medical record reflects the following:  Risk Factors: 60yo admitted with PAF and hx of HTN    Clinical Indicators:  H&P  Rapid afib: new onset, response to cardizem drip, converted to sinus now. Will start po cardizem 60mg q8h and wean off cardizem drip. Start her home metoprolol . Echo. TSH, cardilogist consult    Cardiology  She has hypertension as risk factor along with gender so CHADS 2 vasc score now is 2.     Discharge Summary  ADMISSION SUMMARY AND HOSPITAL COURSE:  Patient was seen by cardiology medication adjusted patient discharged on Cardizem and Eliquis and metoprolol follow-up with cardiology at patient's convenience location at [facility] follow-up with PCP recommended prescription sent to [facility] pharmacy of patient's choice echocardiogram done normal report    Treatment: Cardiology following, treated with norvasc and cardizem IV and changed to beta blocker, Echo    Thank you,  Ciarra Marx  [phone]  Options provided:  -- Secondary hypercoagulable state in a patient with atrial fibrillation  -- Eliquis given as prophylactic treatment only  -- Other - I will add my own diagnosis  -- Disagree - Not applicable / Not valid  -- Disagree - Clinically unable to determine / Unknown  -- Refer to Clinical Documentation Reviewer    PROVIDER RESPONSE TEXT:    Patient was started on Eliquis as prophylactic treatment only.     Query created by: Yokasta Sharma on 1/19/2022 9:29 AM      Electronically signed by:  Kristin Serrano MD 1/19/2022 11:41 AM

## 2022-03-27 ENCOUNTER — HOSPITAL ENCOUNTER (EMERGENCY)
Age: 62
Discharge: HOME | End: 2022-03-27
Attending: EMERGENCY MEDICINE
Payer: COMMERCIAL

## 2022-03-27 VITALS
RESPIRATION RATE: 17 BRPM | HEART RATE: 76 BPM | SYSTOLIC BLOOD PRESSURE: 136 MMHG | WEIGHT: 206.7 LBS | BODY MASS INDEX: 35.48 KG/M2 | OXYGEN SATURATION: 98 % | DIASTOLIC BLOOD PRESSURE: 92 MMHG | TEMPERATURE: 98.3 F

## 2022-03-27 DIAGNOSIS — R00.2 PALPITATIONS: Primary | ICD-10-CM

## 2022-03-27 DIAGNOSIS — E87.6 HYPOKALEMIA: ICD-10-CM

## 2022-03-27 LAB
ALBUMIN SERPL-MCNC: 4.2 G/DL (ref 3.5–5)
ALBUMIN/GLOB SERPL: 1.2 {RATIO} (ref 1.1–2.2)
ALP SERPL-CCNC: 73 U/L (ref 45–117)
ALT SERPL-CCNC: 36 U/L (ref 12–78)
ANION GAP SERPL CALC-SCNC: 7 MMOL/L (ref 5–15)
AST SERPL W P-5'-P-CCNC: 15 U/L (ref 15–37)
BASOPHILS # BLD: 0 K/UL (ref 0–0.2)
BASOPHILS NFR BLD: 0 % (ref 0–2.5)
BILIRUB SERPL-MCNC: 0.2 MG/DL (ref 0.2–1)
BUN SERPL-MCNC: 12 MG/DL (ref 6–20)
BUN/CREAT SERPL: 19 (ref 12–20)
CA-I BLD-MCNC: 9 MG/DL (ref 8.5–10.1)
CHLORIDE SERPL-SCNC: 104 MMOL/L (ref 97–108)
CO2 SERPL-SCNC: 31 MMOL/L (ref 21–32)
CREAT SERPL-MCNC: 0.64 MG/DL (ref 0.55–1.02)
EOSINOPHIL # BLD: 0.2 K/UL (ref 0–0.7)
EOSINOPHIL NFR BLD: 2 % (ref 0.9–2.9)
ERYTHROCYTE [DISTWIDTH] IN BLOOD BY AUTOMATED COUNT: 14.7 % (ref 11.5–14.5)
GLOBULIN SER CALC-MCNC: 3.6 G/DL (ref 2–4)
GLUCOSE SERPL-MCNC: 116 MG/DL (ref 65–100)
HCT VFR BLD AUTO: 40.2 % (ref 36–46)
HGB BLD-MCNC: 12.6 G/DL (ref 13.5–17.5)
INR PPP: 1 (ref 0.9–1.1)
LYMPHOCYTES # BLD: 3.6 K/UL (ref 1–4.8)
LYMPHOCYTES NFR BLD: 39 % (ref 20.5–51.1)
MAGNESIUM SERPL-MCNC: 2.3 MG/DL (ref 1.6–2.4)
MCH RBC QN AUTO: 23.1 PG (ref 31–34)
MCHC RBC AUTO-ENTMCNC: 31.3 G/DL (ref 31–36)
MCV RBC AUTO: 73.7 FL (ref 80–100)
MONOCYTES # BLD: 0.6 K/UL (ref 0.2–2.4)
MONOCYTES NFR BLD: 7 % (ref 1.7–9.3)
NEUTS SEG # BLD: 4.8 K/UL (ref 1.8–7.7)
NEUTS SEG NFR BLD: 52 % (ref 42–75)
NRBC # BLD: 0.02 K/UL
NRBC BLD-RTO: 0.2 PER 100 WBC
PLATELET # BLD AUTO: 293 K/UL (ref 150–400)
PMV BLD AUTO: 8 FL (ref 6.5–11.5)
POTASSIUM SERPL-SCNC: 3.3 MMOL/L (ref 3.5–5.1)
PROT SERPL-MCNC: 7.8 G/DL (ref 6.4–8.2)
PROTHROMBIN TIME: 13.2 SEC (ref 11.9–14.6)
RBC # BLD AUTO: 5.45 M/UL (ref 4.5–5.9)
SODIUM SERPL-SCNC: 142 MMOL/L (ref 136–145)
TROPONIN-HIGH SENSITIVITY: 8 NG/L (ref 0–51)
TSH SERPL DL<=0.05 MIU/L-ACNC: 1.04 UIU/ML (ref 0.36–3.74)
WBC # BLD AUTO: 9.2 K/UL (ref 4.4–11.3)

## 2022-03-27 PROCEDURE — 84443 ASSAY THYROID STIM HORMONE: CPT

## 2022-03-27 PROCEDURE — 93005 ELECTROCARDIOGRAM TRACING: CPT

## 2022-03-27 PROCEDURE — 84484 ASSAY OF TROPONIN QUANT: CPT

## 2022-03-27 PROCEDURE — 74011250637 HC RX REV CODE- 250/637: Performed by: EMERGENCY MEDICINE

## 2022-03-27 PROCEDURE — 80053 COMPREHEN METABOLIC PANEL: CPT

## 2022-03-27 PROCEDURE — 99284 EMERGENCY DEPT VISIT MOD MDM: CPT

## 2022-03-27 PROCEDURE — 83735 ASSAY OF MAGNESIUM: CPT

## 2022-03-27 PROCEDURE — 85025 COMPLETE CBC W/AUTO DIFF WBC: CPT

## 2022-03-27 PROCEDURE — 85610 PROTHROMBIN TIME: CPT

## 2022-03-27 PROCEDURE — 36415 COLL VENOUS BLD VENIPUNCTURE: CPT

## 2022-03-27 RX ORDER — DILTIAZEM HYDROCHLORIDE 120 MG/1
120 CAPSULE, COATED, EXTENDED RELEASE ORAL 2 TIMES DAILY
COMMUNITY
Start: 2022-03-21

## 2022-03-27 RX ORDER — METOPROLOL SUCCINATE 100 MG/1
TABLET, EXTENDED RELEASE ORAL DAILY
COMMUNITY

## 2022-03-27 RX ORDER — POTASSIUM CHLORIDE 20 MEQ/1
20 TABLET, EXTENDED RELEASE ORAL
Status: COMPLETED | OUTPATIENT
Start: 2022-03-27 | End: 2022-03-27

## 2022-03-27 RX ORDER — HYDROCHLOROTHIAZIDE 25 MG/1
25 TABLET ORAL DAILY
COMMUNITY
Start: 2022-03-16

## 2022-03-27 RX ORDER — MONTELUKAST SODIUM 10 MG/1
10 TABLET ORAL
COMMUNITY
Start: 2021-12-20

## 2022-03-27 RX ORDER — APIXABAN 5 MG/1
5 TABLET, FILM COATED ORAL 2 TIMES DAILY
COMMUNITY
Start: 2022-03-04

## 2022-03-27 RX ORDER — FLUTICASONE PROPIONATE AND SALMETEROL 50; 250 UG/1; UG/1
1 POWDER RESPIRATORY (INHALATION) 2 TIMES DAILY
COMMUNITY
Start: 2022-02-12

## 2022-03-27 RX ADMIN — POTASSIUM CHLORIDE 20 MEQ: 1500 TABLET, EXTENDED RELEASE ORAL at 02:53

## 2022-03-27 NOTE — ED TRIAGE NOTES
C/o palpitations around midnight while lying down at home. None on arrival. Pt states she has been very tired. Denies pain/nausea/vomiting. Hx afib, on Eliquis and diltiazem. Completed stress test recently with cardiology and has another appointment coming up.

## 2022-03-27 NOTE — ED PROVIDER NOTES
EMERGENCY DEPARTMENT HISTORY AND PHYSICAL EXAM      Date: 3/27/2022  Patient Name: Khadra Fowler    History of Presenting Illness     No chief complaint on file. History Provided By: Patient    HPI: Khadra Fowler, 64 y.o. female with a past medical history significant hypertension, obesity and GERD presents to the ED with cc of palpitation off and on yesterday. She woke up with papitation around mid-night. Patient is current taking eliquis, HCTZ, Metoprolol, and diltiazem for control of A. Fib. She was dxed with new onset  A. Fib. 1/16/22 admitted to [facility].  Patient express some stress and drinks one coffee in the morning. Patient recently had a stress test test. She is followed by the [facility]. Patient denies chest pain or sob. There are no other complaints, changes, or physical findings at this time. PCP: Tegan Joseph MD    No current facility-administered medications on file prior to encounter. Current Outpatient Medications on File Prior to Encounter   Medication Sig Dispense Refill    ferrous sulfate 325 mg (65 mg iron) tablet Take 325 mg by mouth Daily (before breakfast).  omeprazole (PRILOSEC) 20 mg capsule Take 20 mg by mouth daily.  olopatadine (PATANOL) 0.1 % ophthalmic solution Administer 1 Drop to both eyes two (2) times a day. Past History     Past Medical History:  Past Medical History:   Diagnosis Date    GERD (gastroesophageal reflux disease)     Hypertension        Past Surgical History:  No past surgical history on file. Family History:  No family history on file. Social History:  Social History     Tobacco Use    Smoking status: Never Smoker    Smokeless tobacco: Never Used   Substance Use Topics    Alcohol use: Not Currently    Drug use: Never       Allergies: Allergies   Allergen Reactions    Lisinopril Angioedema         Review of Systems     Review of Systems   Constitutional: Negative. HENT: Negative. Eyes: Negative. Respiratory: Negative. Negative for chest tightness and shortness of breath. Cardiovascular: Positive for palpitations. Negative for chest pain and leg swelling. Gastrointestinal: Negative. Endocrine: Negative. Genitourinary: Negative. Musculoskeletal: Negative. Skin: Negative. Allergic/Immunologic: Negative. Neurological: Negative. Hematological: Negative. Psychiatric/Behavioral: Negative. Physical Exam  Vitals and nursing note reviewed. Constitutional:       Appearance: Normal appearance. HENT:      Head: Normocephalic. Right Ear: Tympanic membrane normal.      Left Ear: Tympanic membrane normal.      Nose: Nose normal.      Mouth/Throat:      Mouth: Mucous membranes are moist.   Eyes:      Pupils: Pupils are equal, round, and reactive to light. Cardiovascular:      Rate and Rhythm: Normal rate. Pulses: Normal pulses. Pulmonary:      Effort: Pulmonary effort is normal.   Abdominal:      General: Abdomen is flat. Palpations: Abdomen is soft. Musculoskeletal:         General: Normal range of motion. Cervical back: Normal range of motion and neck supple. Skin:     General: Skin is warm. Capillary Refill: Capillary refill takes less than 2 seconds. Neurological:      General: No focal deficit present. Mental Status: She is alert and oriented to person, place, and time. Psychiatric:         Mood and Affect: Mood normal.         Lab and Diagnostic Study Results     Labs -   No results found for this or any previous visit (from the past 12 hour(s)). Radiologic Studies -   @lastxrresult@  CT Results  (Last 48 hours)    None        CXR Results  (Last 48 hours)    None            Medical Decision Making   - I am the first provider for this patient. - I reviewed the vital signs, available nursing notes, past medical history, past surgical history, family history and social history.     - Initial assessment performed. The patients presenting problems have been discussed, and they are in agreement with the care plan formulated and outlined with them. I have encouraged them to ask questions as they arise throughout their visit. Vital Signs-Reviewed the patient's vital signs. No data found. Records Reviewed: Nursing Notes and Old Medical Records    The patient presents with chest pain with a differential diagnosis of  abnormal EKG, ACS, arrhythmia, acute MI, pulmonary edema/CHF, angina, aortic dissection, chest wall pain, pericarditis and pnuemonia      ED Course:          Provider Notes (Medical Decision Making): MDM       Procedures   Medical Decision Makingedical Decision Making  Performed by: Pham Omalley MD  PROCEDURESProcedures       Disposition   Disposition: Condition stable  DC- Adult Discharges: All of the diagnostic tests were reviewed and questions answered. Diagnosis, care plan and treatment options were discussed. The patient understands the instructions and will follow up as directed. The patients results have been reviewed with them. They have been counseled regarding their diagnosis. The patient verbally convey understanding and agreement of the signs, symptoms, diagnosis, treatment and prognosis and additionally agrees to follow up as recommended with their PCP in 24 - 48 hours. They also agree with the care-plan and convey that all of their questions have been answered. I have also put together some discharge instructions for them that include: 1) educational information regarding their diagnosis, 2) how to care for their diagnosis at home, as well a 3) list of reasons why they would want to return to the ED prior to their follow-up appointment, should their condition change. DISCHARGE PLAN:  1.    Current Discharge Medication List      CONTINUE these medications which have NOT CHANGED    Details   ferrous sulfate 325 mg (65 mg iron) tablet Take 325 mg by mouth Daily (before breakfast). omeprazole (PRILOSEC) 20 mg capsule Take 20 mg by mouth daily. olopatadine (PATANOL) 0.1 % ophthalmic solution Administer 1 Drop to both eyes two (2) times a day. 2.   Follow-up Information    None       3. Return to ED if worse   4. Current Discharge Medication List            Diagnosis     Clinical Impression:   Attestations:     ICD-10-CM ICD-9-CM    1. Palpitations  R00.2 785.1    2. Hypokalemia  E87.6 276.8        Ji Joy MD    Please note that this dictation was completed with [employer], the [employer] voice recognition software. Quite often unanticipated grammatical, syntax, homophones, and other interpretive errors are inadvertently transcribed by the computer software. Please disregard these errors. Please excuse any errors that have escaped final proofreading. Thank you.

## 2022-03-27 NOTE — ED NOTES
Pt verbalize understanding of dc instruction. Advised to call cardiologist for follow up. Pt states no further questions.

## 2022-03-27 NOTE — DISCHARGE INSTRUCTIONS
Call cardiologist office on Monday for an appointment for next week. Eat bananas and drink orange juice daily to increase potassium. Avoid drinking coffee.

## 2022-03-28 ENCOUNTER — HOSPITAL ENCOUNTER (OUTPATIENT)
Dept: LAB | Age: 62
Discharge: HOME | End: 2022-03-28
Payer: COMMERCIAL

## 2022-03-28 ENCOUNTER — TRANSCRIBE ORDER (OUTPATIENT)
Dept: REGISTRATION | Age: 62
End: 2022-03-28

## 2022-03-28 DIAGNOSIS — Z79.899 LONG TERM CURRENT USE OF DIURETIC: ICD-10-CM

## 2022-03-28 DIAGNOSIS — E87.6 HYPOKALEMIA: ICD-10-CM

## 2022-03-28 DIAGNOSIS — E87.6 HYPOKALEMIA: Primary | ICD-10-CM

## 2022-03-28 LAB
ANION GAP SERPL CALC-SCNC: 7 MMOL/L (ref 5–15)
ATRIAL RATE: 76 BPM
BUN SERPL-MCNC: 12 MG/DL (ref 6–20)
BUN/CREAT SERPL: 24 (ref 12–20)
CA-I BLD-MCNC: 9.1 MG/DL (ref 8.5–10.1)
CALCULATED P AXIS, ECG09: 13 DEGREES
CALCULATED R AXIS, ECG10: -5 DEGREES
CALCULATED T AXIS, ECG11: 21 DEGREES
CHLORIDE SERPL-SCNC: 104 MMOL/L (ref 97–108)
CO2 SERPL-SCNC: 29 MMOL/L (ref 21–32)
CREAT SERPL-MCNC: 0.51 MG/DL (ref 0.55–1.02)
DIAGNOSIS, 93000: NORMAL
GLUCOSE SERPL-MCNC: 84 MG/DL (ref 65–100)
MAGNESIUM SERPL-MCNC: 2.2 MG/DL (ref 1.6–2.4)
P-R INTERVAL, ECG05: 210 MS
POTASSIUM SERPL-SCNC: 3.9 MMOL/L (ref 3.5–5.1)
Q-T INTERVAL, ECG07: 394 MS
QRS DURATION, ECG06: 95 MS
QTC CALCULATION (BEZET), ECG08: 441 MS
SODIUM SERPL-SCNC: 140 MMOL/L (ref 136–145)
VENTRICULAR RATE, ECG03: 75 BPM

## 2022-03-28 PROCEDURE — 83735 ASSAY OF MAGNESIUM: CPT

## 2022-03-28 PROCEDURE — 80048 BASIC METABOLIC PNL TOTAL CA: CPT

## 2022-03-28 PROCEDURE — 36415 COLL VENOUS BLD VENIPUNCTURE: CPT

## 2023-05-22 RX ORDER — FLUTICASONE PROPIONATE AND SALMETEROL 250; 50 UG/1; UG/1
1 POWDER RESPIRATORY (INHALATION) 2 TIMES DAILY
COMMUNITY
Start: 2022-02-12

## 2023-05-22 RX ORDER — MONTELUKAST SODIUM 10 MG/1
10 TABLET ORAL
COMMUNITY
Start: 2021-12-20

## 2023-05-22 RX ORDER — METOPROLOL SUCCINATE 100 MG/1
TABLET, EXTENDED RELEASE ORAL DAILY
COMMUNITY

## 2023-05-22 RX ORDER — DILTIAZEM HYDROCHLORIDE 120 MG/1
120 CAPSULE, EXTENDED RELEASE ORAL 2 TIMES DAILY
COMMUNITY
Start: 2022-03-21

## 2023-05-22 RX ORDER — OLOPATADINE HYDROCHLORIDE 1 MG/ML
1 SOLUTION/ DROPS OPHTHALMIC 2 TIMES DAILY
COMMUNITY

## 2023-05-22 RX ORDER — HYDROCHLOROTHIAZIDE 25 MG/1
25 TABLET ORAL DAILY
COMMUNITY
Start: 2022-03-16

## 2023-05-22 RX ORDER — OMEPRAZOLE 20 MG/1
20 CAPSULE, DELAYED RELEASE ORAL DAILY
COMMUNITY

## 2023-05-22 RX ORDER — FERROUS SULFATE 325(65) MG
325 TABLET ORAL
COMMUNITY

## 2023-06-19 ENCOUNTER — TRANSCRIBE ORDERS (OUTPATIENT)
Facility: HOSPITAL | Age: 63
End: 2023-06-19

## 2023-06-19 DIAGNOSIS — Z12.31 SCREENING MAMMOGRAM FOR BREAST CANCER: Primary | ICD-10-CM

## 2023-06-26 ENCOUNTER — HOSPITAL ENCOUNTER (OUTPATIENT)
Facility: HOSPITAL | Age: 63
Discharge: HOME | End: 2023-06-29
Attending: FAMILY MEDICINE
Payer: COMMERCIAL

## 2023-06-26 DIAGNOSIS — Z12.31 SCREENING MAMMOGRAM FOR BREAST CANCER: ICD-10-CM

## 2023-06-26 PROCEDURE — 77063 BREAST TOMOSYNTHESIS BI: CPT

## 2023-12-09 ENCOUNTER — HOSPITAL ENCOUNTER (EMERGENCY)
Facility: HOSPITAL | Age: 63
Discharge: HOME | End: 2023-12-09
Attending: STUDENT IN AN ORGANIZED HEALTH CARE EDUCATION/TRAINING PROGRAM
Payer: COMMERCIAL

## 2023-12-09 VITALS
OXYGEN SATURATION: 95 % | BODY MASS INDEX: 32.99 KG/M2 | DIASTOLIC BLOOD PRESSURE: 84 MMHG | TEMPERATURE: 98.2 F | HEIGHT: 65 IN | HEART RATE: 82 BPM | RESPIRATION RATE: 20 BRPM | WEIGHT: 198 LBS | SYSTOLIC BLOOD PRESSURE: 170 MMHG

## 2023-12-09 DIAGNOSIS — R00.0 TACHYCARDIA: Primary | ICD-10-CM

## 2023-12-09 LAB
EKG ATRIAL RATE: 79 BPM
EKG DIAGNOSIS: NORMAL
EKG P AXIS: 10 DEGREES
EKG P-R INTERVAL: 187 MS
EKG Q-T INTERVAL: 379 MS
EKG QRS DURATION: 95 MS
EKG QTC CALCULATION (BAZETT): 432 MS
EKG R AXIS: 0 DEGREES
EKG T AXIS: 44 DEGREES
EKG VENTRICULAR RATE: 78 BPM

## 2023-12-09 PROCEDURE — 93005 ELECTROCARDIOGRAM TRACING: CPT | Performed by: STUDENT IN AN ORGANIZED HEALTH CARE EDUCATION/TRAINING PROGRAM

## 2023-12-09 PROCEDURE — 99283 EMERGENCY DEPT VISIT LOW MDM: CPT

## 2023-12-09 ASSESSMENT — PAIN SCALES - GENERAL: PAINLEVEL_OUTOF10: 0

## 2023-12-09 ASSESSMENT — PAIN - FUNCTIONAL ASSESSMENT
PAIN_FUNCTIONAL_ASSESSMENT: 0-10
PAIN_FUNCTIONAL_ASSESSMENT: NONE - DENIES PAIN

## 2023-12-09 NOTE — ED PROVIDER NOTES
[facility] EMERGENCY DEPT  EMERGENCY DEPARTMENT HISTORY AND PHYSICAL EXAM      Date: 12/9/2023  Patient Name: Adriana Coats  MRN: [id number]  [facility] [facility] 1960  Date of evaluation: 12/9/2023  Provider: Cora Dolan MD   Note Started: 1:28 PM EST 12/9/23    HISTORY OF PRESENT ILLNESS     Chief Complaint   Patient presents with    wellness check        History Provided By: Patient    HPI: Adriana Cotas is a 61 y.o. female history including atrial fibrillation, hypertension, presents with concern for reading of tachycardia and hypoxia on her home pulse oximeter. Patient reports she was driving when she saw sign of hypoxia and tachycardia, she denies any symptoms at all. She says she did not wait long or look at the oximeter long before coming to the emergency department. PAST MEDICAL HISTORY   Past Medical History:  Past Medical History:   Diagnosis Date    Atrial fibrillation ([address])     GERD (gastroesophageal reflux disease)     Hypertension        Past Surgical History:  Past Surgical History:   Procedure Laterality Date    HYSTERECTOMY (CERVIX STATUS UNKNOWN)         Family History:  Family History   Problem Relation Age of Onset    Breast Cancer Sister        Social History:  Social History     Tobacco Use    Smoking status: Never    Smokeless tobacco: Never   Substance Use Topics    Alcohol use: Not Currently    Drug use: Never       Allergies: Allergies   Allergen Reactions    Lisinopril Angioedema       PCP: Annita Bamberger, MD    Current Meds:   No current facility-administered medications for this encounter.      Current Outpatient Medications   Medication Sig Dispense Refill    apixaban (ELIQUIS) 5 MG TABS tablet Take 1 tablet by mouth 2 times daily      dilTIAZem (TIAZAC) 120 MG extended release capsule Take 1 capsule by mouth 2 times daily      ferrous sulfate (IRON 325) 325 (65 Fe) MG tablet Take 1 tablet by mouth every morning (before breakfast)      fluticasone-salmeterol (ADVAIR DISKUS)

## 2023-12-09 NOTE — DISCHARGE INSTRUCTIONS
Thank you! Thank you for allowing me to care for you in the emergency department. It is my goal to provide you with excellent care. If you have not received excellent quality care, please ask to speak to the nurse manager. Please fill out the survey that will come to you by mail or email since we listen to your feedback! Below you will find a list of your tests from today's visit. Should you have any questions, please do not hesitate to call the emergency department. Labs  Recent Results (from the past 12 hour(s))   EKG 12 Lead    Collection Time: 12/09/23  1:04 PM   Result Value Ref Range    Ventricular Rate 78 BPM    Atrial Rate 79 BPM    P-R Interval 187 ms    QRS Duration 95 ms    Q-T Interval 379 ms    QTc Calculation (Bazett) 432 ms    P Axis 10 degrees    R Axis 0 degrees    T Axis 44 degrees    Diagnosis       Sinus rhythm  Left atrial enlargement  Minimal ST depression, lateral leads  Baseline wander in lead(s) I,II,III,aVR,aVL,aVF,V1,V3,V4,V5         Radiologic Studies  No orders to display     ------------------------------------------------------------------------------------------------------------  The exam and treatment you received in the Emergency Department were for an urgent problem and are not intended as complete care. It is important that you follow-up with a doctor, nurse practitioner, or physician assistant to:  (1) confirm your diagnosis,  (2) re-evaluation of changes in your illness and treatment, and  (3) for ongoing care. Please take your discharge instructions with you when you go to your follow-up appointment. If you have any problem arranging a follow-up appointment, contact the Emergency Department. If your symptoms become worse or you do not improve as expected and you are unable to reach your health care provider, please return to the Emergency Department. We are available 24 hours a day.

## 2023-12-09 NOTE — ED TRIAGE NOTES
Pt reported she checked her HR on her pulse ox this morning and it read that her HR was 103, pt reported she has a history of afib and wanted to get checked out, pt with no complaints at this time, pt saw PCP on Thursday and reported everything checked out good

## 2024-02-12 ENCOUNTER — HOSPITAL ENCOUNTER (EMERGENCY)
Facility: HOSPITAL | Age: 64
Discharge: HOME | End: 2024-02-13
Attending: EMERGENCY MEDICINE
Payer: COMMERCIAL

## 2024-02-12 DIAGNOSIS — R00.2 PALPITATIONS: Primary | ICD-10-CM

## 2024-02-12 LAB
ALBUMIN SERPL-MCNC: 3.7 G/DL (ref 3.5–5)
ALBUMIN/GLOB SERPL: 1.1 (ref 1.1–2.2)
ALP SERPL-CCNC: 63 U/L (ref 45–117)
ALT SERPL-CCNC: 22 U/L (ref 12–78)
ANION GAP SERPL CALC-SCNC: 6 MMOL/L (ref 5–15)
AST SERPL W P-5'-P-CCNC: 11 U/L (ref 15–37)
BASOPHILS # BLD: 0.1 K/UL (ref 0–0.1)
BASOPHILS NFR BLD: 1 % (ref 0–1)
BILIRUB SERPL-MCNC: 0.3 MG/DL (ref 0.2–1)
BUN SERPL-MCNC: 13 MG/DL (ref 6–20)
BUN/CREAT SERPL: 16 (ref 12–20)
CA-I BLD-MCNC: 9 MG/DL (ref 8.5–10.1)
CHLORIDE SERPL-SCNC: 103 MMOL/L (ref 97–108)
CO2 SERPL-SCNC: 31 MMOL/L (ref 21–32)
CREAT SERPL-MCNC: 0.81 MG/DL (ref 0.55–1.02)
DIFFERENTIAL METHOD BLD: ABNORMAL
EOSINOPHIL # BLD: 0.2 K/UL (ref 0–0.4)
EOSINOPHIL NFR BLD: 2 % (ref 0–7)
ERYTHROCYTE [DISTWIDTH] IN BLOOD BY AUTOMATED COUNT: 15.1 % (ref 11.5–14.5)
GLOBULIN SER CALC-MCNC: 3.5 G/DL (ref 2–4)
GLUCOSE SERPL-MCNC: 135 MG/DL (ref 65–100)
HCT VFR BLD AUTO: 36.6 % (ref 35–47)
HGB BLD-MCNC: 11.7 G/DL (ref 11.5–16)
IMM GRANULOCYTES # BLD AUTO: 0 K/UL
IMM GRANULOCYTES NFR BLD AUTO: 0 %
LYMPHOCYTES # BLD: 2.9 K/UL (ref 0.8–3.5)
LYMPHOCYTES NFR BLD: 33 % (ref 12–49)
MCH RBC QN AUTO: 24 PG (ref 26–34)
MCHC RBC AUTO-ENTMCNC: 32 G/DL (ref 30–36.5)
MCV RBC AUTO: 75 FL (ref 80–99)
MONOCYTES # BLD: 0.6 K/UL (ref 0–1)
MONOCYTES NFR BLD: 7 % (ref 5–13)
NEUTS SEG # BLD: 5 K/UL (ref 1.8–8)
NEUTS SEG NFR BLD: 57 % (ref 32–75)
NRBC # BLD: 0 K/UL (ref 0–0.01)
NRBC BLD-RTO: 0 PER 100 WBC
PLATELET # BLD AUTO: 292 K/UL (ref 150–400)
PMV BLD AUTO: 10 FL (ref 8.9–12.9)
POTASSIUM SERPL-SCNC: 3.2 MMOL/L (ref 3.5–5.1)
PROT SERPL-MCNC: 7.2 G/DL (ref 6.4–8.2)
RBC # BLD AUTO: 4.88 M/UL (ref 3.8–5.2)
RBC MORPH BLD: ABNORMAL
SODIUM SERPL-SCNC: 140 MMOL/L (ref 136–145)
TROPONIN I SERPL HS-MCNC: 17 NG/L (ref 0–51)
TROPONIN I SERPL HS-MCNC: 34 NG/L (ref 0–51)
WBC # BLD AUTO: 8.8 K/UL (ref 3.6–11)

## 2024-02-12 PROCEDURE — 6370000000 HC RX 637 (ALT 250 FOR IP): Performed by: EMERGENCY MEDICINE

## 2024-02-12 PROCEDURE — 85025 COMPLETE CBC W/AUTO DIFF WBC: CPT

## 2024-02-12 PROCEDURE — 93005 ELECTROCARDIOGRAM TRACING: CPT | Performed by: EMERGENCY MEDICINE

## 2024-02-12 PROCEDURE — 99284 EMERGENCY DEPT VISIT MOD MDM: CPT

## 2024-02-12 PROCEDURE — 80053 COMPREHEN METABOLIC PANEL: CPT

## 2024-02-12 PROCEDURE — 84484 ASSAY OF TROPONIN QUANT: CPT

## 2024-02-12 PROCEDURE — 36415 COLL VENOUS BLD VENIPUNCTURE: CPT

## 2024-02-12 RX ORDER — DILTIAZEM HYDROCHLORIDE 120 MG/1
120 CAPSULE, COATED, EXTENDED RELEASE ORAL 2 TIMES DAILY
COMMUNITY
Start: 2023-11-28

## 2024-02-12 RX ORDER — POTASSIUM CHLORIDE 20 MEQ/1
40 TABLET, EXTENDED RELEASE ORAL
Status: COMPLETED | OUTPATIENT
Start: 2024-02-12 | End: 2024-02-12

## 2024-02-12 RX ORDER — METOPROLOL SUCCINATE 50 MG/1
TABLET, EXTENDED RELEASE ORAL
COMMUNITY
Start: 2023-12-05

## 2024-02-12 RX ORDER — ALBUTEROL SULFATE 2.5 MG/3ML
SOLUTION RESPIRATORY (INHALATION)
COMMUNITY
Start: 2023-12-05

## 2024-02-12 RX ADMIN — POTASSIUM CHLORIDE 40 MEQ: 1500 TABLET, EXTENDED RELEASE ORAL at 22:16

## 2024-02-13 VITALS
TEMPERATURE: 98.1 F | BODY MASS INDEX: 40.72 KG/M2 | WEIGHT: 207.4 LBS | HEIGHT: 60 IN | HEART RATE: 73 BPM | OXYGEN SATURATION: 97 % | SYSTOLIC BLOOD PRESSURE: 142 MMHG | RESPIRATION RATE: 19 BRPM | DIASTOLIC BLOOD PRESSURE: 78 MMHG

## 2024-02-13 LAB
EKG ATRIAL RATE: 91 BPM
EKG DIAGNOSIS: NORMAL
EKG P AXIS: 2 DEGREES
EKG P-R INTERVAL: 214 MS
EKG Q-T INTERVAL: 369 MS
EKG QRS DURATION: 94 MS
EKG QTC CALCULATION (BAZETT): 455 MS
EKG R AXIS: 0 DEGREES
EKG T AXIS: 41 DEGREES
EKG VENTRICULAR RATE: 91 BPM

## 2024-02-13 NOTE — ED TRIAGE NOTES
Pt states that when she was getting ready to take a bath, her chest started to \"feel funny\" and she checked her HR at 132. Pt has Hx of Afib, Htn. Pt denies chest pain or any other s/s.

## 2024-02-13 NOTE — ED NOTES
[facility] EMERGENCY DEPT  EMERGENCY DEPARTMENT ENCOUNTER      Pt Name: Ruba Daniels  MRN: [id number]  Birthdate 1960  Date of evaluation: 2/12/2024  Provider: James Goldman MD  9:06 PM    CHIEF COMPLAINT       Chief Complaint   Patient presents with    Tachycardia         HISTORY OF PRESENT ILLNESS    Ruba Daniels is a 63 y.o. female with history of A-fib who presents to the emergency department with complaint of patient's which is described as a fast rate, that has now resolved.  She denies chest pain.      Nursing Notes were reviewed.    REVIEW OF SYSTEMS       Review of Systems   Constitutional: Negative.    HENT: Negative.     Eyes: Negative.    Respiratory: Negative.     Cardiovascular:  Positive for palpitations. Negative for chest pain.   Gastrointestinal: Negative.    Genitourinary: Negative.    Musculoskeletal: Negative.    Neurological: Negative.    Hematological: Negative.        Except as noted above the remainder of the review of systems was reviewed and negative.       PAST MEDICAL HISTORY     Past Medical History:   Diagnosis Date    Asthma     Atrial fibrillation (HCC)     GERD (gastroesophageal reflux disease)     Hypertension          SURGICAL HISTORY       Past Surgical History:   Procedure Laterality Date    HYSTERECTOMY (CERVIX STATUS UNKNOWN)           CURRENT MEDICATIONS       Previous Medications    ALBUTEROL (PROVENTIL) (2.5 MG/3ML) 0.083% NEBULIZER SOLUTION    USE 1 VIAL VIA NEBULIZER EVERY 4 HOURS AS NEEDED FOR WHEEZDE    APIXABAN (ELIQUIS) 5 MG TABS TABLET    Take 1 tablet by mouth 2 times daily    DILTIAZEM (CARDIZEM CD) 120 MG EXTENDED RELEASE CAPSULE    Take 1 capsule by mouth 2 times daily    FERROUS SULFATE (IRON 325) 325 (65 FE) MG TABLET    Take 1 tablet by mouth every morning (before breakfast)    FLUTICASONE-SALMETEROL (ADVAIR DISKUS) 250-50 MCG/ACT AEPB DISKUS INHALER    Inhale 1 puff into the lungs 2 times daily    HYDROCHLOROTHIAZIDE (HYDRODIURIL) 25 MG TABLET    Take 1

## 2024-04-03 ENCOUNTER — TRANSCRIBE ORDERS (OUTPATIENT)
Facility: HOSPITAL | Age: 64
End: 2024-04-03

## 2024-04-03 DIAGNOSIS — I10 ESSENTIAL HYPERTENSION, MALIGNANT: Primary | ICD-10-CM

## 2024-04-10 ENCOUNTER — HOSPITAL ENCOUNTER (OUTPATIENT)
Facility: HOSPITAL | Age: 64
Discharge: HOME | End: 2024-04-12
Attending: INTERNAL MEDICINE
Payer: COMMERCIAL

## 2024-04-10 VITALS — HEIGHT: 63 IN | WEIGHT: 206 LBS | BODY MASS INDEX: 36.5 KG/M2

## 2024-04-10 DIAGNOSIS — I10 ESSENTIAL HYPERTENSION, MALIGNANT: ICD-10-CM

## 2024-04-10 LAB
ECHO AO ROOT DIAM: 3.5 CM
ECHO AO ROOT INDEX: 1.79 CM/M2
ECHO AV AREA PEAK VELOCITY: 2.8 CM2
ECHO AV AREA VTI: 2.6 CM2
ECHO AV AREA/BSA PEAK VELOCITY: 1.4 CM2/M2
ECHO AV AREA/BSA VTI: 1.3 CM2/M2
ECHO AV MEAN GRADIENT: 3 MMHG
ECHO AV MEAN VELOCITY: 0.8 M/S
ECHO AV PEAK GRADIENT: 5 MMHG
ECHO AV PEAK VELOCITY: 1.2 M/S
ECHO AV VELOCITY RATIO: 0.75
ECHO AV VTI: 27.9 CM
ECHO BSA: 2.04 M2
ECHO EST RA PRESSURE: 3 MMHG
ECHO LA DIAMETER INDEX: 1.43 CM/M2
ECHO LA DIAMETER: 2.8 CM
ECHO LA TO AORTIC ROOT RATIO: 0.8
ECHO LA VOL A-L A2C: 34 ML (ref 22–52)
ECHO LA VOL A-L A4C: 27 ML (ref 22–52)
ECHO LA VOL BP: 29 ML (ref 22–52)
ECHO LA VOL MOD A2C: 33 ML (ref 22–52)
ECHO LA VOL MOD A4C: 23 ML (ref 22–52)
ECHO LA VOL/BSA BIPLANE: 15 ML/M2 (ref 16–34)
ECHO LA VOLUME AREA LENGTH: 32 ML
ECHO LA VOLUME INDEX A-L A2C: 17 ML/M2 (ref 16–34)
ECHO LA VOLUME INDEX A-L A4C: 14 ML/M2 (ref 16–34)
ECHO LA VOLUME INDEX AREA LENGTH: 16 ML/M2 (ref 16–34)
ECHO LA VOLUME INDEX MOD A2C: 17 ML/M2 (ref 16–34)
ECHO LA VOLUME INDEX MOD A4C: 12 ML/M2 (ref 16–34)
ECHO LV E' LATERAL VELOCITY: 11 CM/S
ECHO LV E' SEPTAL VELOCITY: 5 CM/S
ECHO LV EDV A2C: 72 ML
ECHO LV EDV A4C: 69 ML
ECHO LV EDV BP: 72 ML (ref 56–104)
ECHO LV EDV INDEX A4C: 35 ML/M2
ECHO LV EDV INDEX BP: 37 ML/M2
ECHO LV EDV NDEX A2C: 37 ML/M2
ECHO LV EJECTION FRACTION A2C: 57 %
ECHO LV EJECTION FRACTION A4C: 58 %
ECHO LV EJECTION FRACTION BIPLANE: 56 % (ref 55–100)
ECHO LV ESV A2C: 31 ML
ECHO LV ESV A4C: 29 ML
ECHO LV ESV BP: 32 ML (ref 19–49)
ECHO LV ESV INDEX A2C: 16 ML/M2
ECHO LV ESV INDEX A4C: 15 ML/M2
ECHO LV ESV INDEX BP: 16 ML/M2
ECHO LV FRACTIONAL SHORTENING: 37 % (ref 28–44)
ECHO LV GLOBAL LONGITUDINAL STRAIN (GLS): -16.6 %
ECHO LV INTERNAL DIMENSION DIASTOLE INDEX: 2.65 CM/M2
ECHO LV INTERNAL DIMENSION DIASTOLIC: 5.2 CM (ref 3.9–5.3)
ECHO LV INTERNAL DIMENSION SYSTOLIC INDEX: 1.68 CM/M2
ECHO LV INTERNAL DIMENSION SYSTOLIC: 3.3 CM
ECHO LV IVSD: 0.9 CM (ref 0.6–0.9)
ECHO LV MASS 2D: 181.4 G (ref 67–162)
ECHO LV MASS INDEX 2D: 92.5 G/M2 (ref 43–95)
ECHO LV POSTERIOR WALL DIASTOLIC: 1 CM (ref 0.6–0.9)
ECHO LV RELATIVE WALL THICKNESS RATIO: 0.38
ECHO LVOT AREA: 3.8 CM2
ECHO LVOT AV VTI INDEX: 0.71
ECHO LVOT DIAM: 2.2 CM
ECHO LVOT MEAN GRADIENT: 1 MMHG
ECHO LVOT PEAK GRADIENT: 3 MMHG
ECHO LVOT PEAK VELOCITY: 0.9 M/S
ECHO LVOT STROKE VOLUME INDEX: 38.6 ML/M2
ECHO LVOT SV: 75.6 ML
ECHO LVOT VTI: 19.9 CM
ECHO MV A VELOCITY: 0.74 M/S
ECHO MV E DECELERATION TIME (DT): 199.8 MS
ECHO MV E VELOCITY: 0.65 M/S
ECHO MV E/A RATIO: 0.88
ECHO MV E/E' LATERAL: 5.91
ECHO MV E/E' RATIO (AVERAGED): 9.45
ECHO RA AREA 4C: 15.2 CM2
ECHO RIGHT VENTRICULAR SYSTOLIC PRESSURE (RVSP): 20 MMHG
ECHO RV FREE WALL PEAK S': 13 CM/S
ECHO RV INTERNAL DIMENSION: 3.1 CM
ECHO RV TAPSE: 2.7 CM (ref 1.7–?)
ECHO TV REGURGITANT MAX VELOCITY: 2.06 M/S
ECHO TV REGURGITANT PEAK GRADIENT: 17 MMHG

## 2024-04-10 PROCEDURE — 93306 TTE W/DOPPLER COMPLETE: CPT

## 2024-06-11 ENCOUNTER — PREP FOR PROCEDURE (OUTPATIENT)
Age: 64
End: 2024-06-11

## 2024-06-11 ENCOUNTER — TELEPHONE (OUTPATIENT)
Age: 64
End: 2024-06-11

## 2024-06-11 DIAGNOSIS — Z12.11 ENCOUNTER FOR SCREENING FOR MALIGNANT NEOPLASM OF COLON: ICD-10-CM

## 2024-06-11 DIAGNOSIS — Z12.11 ENCOUNTER FOR SCREENING FOR MALIGNANT NEOPLASM OF COLON: Primary | ICD-10-CM

## 2024-06-11 NOTE — TELEPHONE ENCOUNTER
I TALKED WITH KENNEDY,REVIEWED CS QUESTIONNAIRE.SHE IS OK TO SCHEDULE AND CHOSE.7-10-24.AT 10:00 AM.  SEE PROCEDURE CHECKLIST.

## 2024-06-12 RX ORDER — POLYETHYLENE GLYCOL 3350 17 G/17G
POWDER, FOR SOLUTION ORAL
Qty: 510 G | Refills: 0 | Status: SHIPPED | OUTPATIENT
Start: 2024-06-12

## 2024-06-13 ENCOUNTER — TELEPHONE (OUTPATIENT)
Age: 64
End: 2024-06-13

## 2024-06-13 NOTE — TELEPHONE ENCOUNTER
NOTIFIED PATIENT THAT DR STARR GAVE CONSENT FOR HER TO HOLD THE ELIQUIS 4 DAYS PRIOR PROCEDURE . SHE SAID OK

## 2024-06-27 ENCOUNTER — HOSPITAL ENCOUNTER (OUTPATIENT)
Facility: HOSPITAL | Age: 64
Discharge: HOME | End: 2024-06-27
Attending: FAMILY MEDICINE
Payer: COMMERCIAL

## 2024-06-27 VITALS — BODY MASS INDEX: 36.5 KG/M2 | WEIGHT: 206 LBS | HEIGHT: 63 IN

## 2024-06-27 DIAGNOSIS — Z12.31 ENCOUNTER FOR SCREENING MAMMOGRAM FOR MALIGNANT NEOPLASM OF BREAST: ICD-10-CM

## 2024-06-27 PROCEDURE — 77063 BREAST TOMOSYNTHESIS BI: CPT

## 2024-07-10 ENCOUNTER — HOSPITAL ENCOUNTER (OUTPATIENT)
Facility: HOSPITAL | Age: 64
Setting detail: OUTPATIENT SURGERY
Discharge: HOME | End: 2024-07-10
Attending: INTERNAL MEDICINE | Admitting: INTERNAL MEDICINE
Payer: COMMERCIAL

## 2024-07-10 ENCOUNTER — ANESTHESIA (OUTPATIENT)
Facility: HOSPITAL | Age: 64
End: 2024-07-10
Payer: COMMERCIAL

## 2024-07-10 ENCOUNTER — ANESTHESIA EVENT (OUTPATIENT)
Facility: HOSPITAL | Age: 64
End: 2024-07-10
Payer: COMMERCIAL

## 2024-07-10 VITALS
HEIGHT: 62 IN | DIASTOLIC BLOOD PRESSURE: 65 MMHG | TEMPERATURE: 97.8 F | HEART RATE: 71 BPM | WEIGHT: 202.8 LBS | OXYGEN SATURATION: 97 % | SYSTOLIC BLOOD PRESSURE: 133 MMHG | BODY MASS INDEX: 37.32 KG/M2 | RESPIRATION RATE: 18 BRPM

## 2024-07-10 PROBLEM — I10 HYPERTENSION: Status: ACTIVE | Noted: 2024-07-10

## 2024-07-10 PROCEDURE — 3700000001 HC ADD 15 MINUTES (ANESTHESIA): Performed by: INTERNAL MEDICINE

## 2024-07-10 PROCEDURE — 3700000000 HC ANESTHESIA ATTENDED CARE: Performed by: INTERNAL MEDICINE

## 2024-07-10 PROCEDURE — 6360000002 HC RX W HCPCS: Performed by: NURSE ANESTHETIST, CERTIFIED REGISTERED

## 2024-07-10 PROCEDURE — 3600007512: Performed by: INTERNAL MEDICINE

## 2024-07-10 PROCEDURE — 3600007502: Performed by: INTERNAL MEDICINE

## 2024-07-10 PROCEDURE — 2500000003 HC RX 250 WO HCPCS: Performed by: NURSE ANESTHETIST, CERTIFIED REGISTERED

## 2024-07-10 PROCEDURE — 2709999900 HC NON-CHARGEABLE SUPPLY: Performed by: INTERNAL MEDICINE

## 2024-07-10 PROCEDURE — 7100000011 HC PHASE II RECOVERY - ADDTL 15 MIN: Performed by: INTERNAL MEDICINE

## 2024-07-10 PROCEDURE — 7100000010 HC PHASE II RECOVERY - FIRST 15 MIN: Performed by: INTERNAL MEDICINE

## 2024-07-10 PROCEDURE — 88305 TISSUE EXAM BY PATHOLOGIST: CPT

## 2024-07-10 PROCEDURE — 2580000003 HC RX 258: Performed by: INTERNAL MEDICINE

## 2024-07-10 RX ORDER — SODIUM CHLORIDE 9 MG/ML
25 INJECTION, SOLUTION INTRAVENOUS PRN
Status: DISCONTINUED | OUTPATIENT
Start: 2024-07-10 | End: 2024-07-10 | Stop reason: HOSPADM

## 2024-07-10 RX ORDER — LIDOCAINE HYDROCHLORIDE 20 MG/ML
INJECTION, SOLUTION EPIDURAL; INFILTRATION; INTRACAUDAL; PERINEURAL PRN
Status: DISCONTINUED | OUTPATIENT
Start: 2024-07-10 | End: 2024-07-10 | Stop reason: SDUPTHER

## 2024-07-10 RX ORDER — POTASSIUM CHLORIDE 750 MG/1
10 CAPSULE, EXTENDED RELEASE ORAL DAILY
COMMUNITY

## 2024-07-10 RX ORDER — PROPOFOL 10 MG/ML
INJECTION, EMULSION INTRAVENOUS PRN
Status: DISCONTINUED | OUTPATIENT
Start: 2024-07-10 | End: 2024-07-10 | Stop reason: SDUPTHER

## 2024-07-10 RX ADMIN — LIDOCAINE HYDROCHLORIDE 50 MG: 20 INJECTION, SOLUTION EPIDURAL; INFILTRATION; INTRACAUDAL; PERINEURAL at 11:33

## 2024-07-10 RX ADMIN — PROPOFOL 100 MG: 10 INJECTION, EMULSION INTRAVENOUS at 11:39

## 2024-07-10 RX ADMIN — SODIUM CHLORIDE 25 ML: 9 INJECTION, SOLUTION INTRAVENOUS at 09:55

## 2024-07-10 RX ADMIN — PROPOFOL 100 MG: 10 INJECTION, EMULSION INTRAVENOUS at 11:33

## 2024-07-10 RX ADMIN — PROPOFOL 100 MG: 10 INJECTION, EMULSION INTRAVENOUS at 11:44

## 2024-07-10 ASSESSMENT — PAIN - FUNCTIONAL ASSESSMENT
PAIN_FUNCTIONAL_ASSESSMENT: 0-10

## 2024-07-10 NOTE — DISCHARGE INSTRUCTIONS
For the next 12 hours you should not:   1. drive   2. drink alcohol   3. operate any machinery   4. engage in activities that require mental sharpness or manual dexterity such as     cooking   5. take any drugs other than those prescribed by a physician   6. make any legal or financial decisions    Call your doctor's office immediately, if there is is anything unusual:   1. increased and continuing rectal bleeding   2. fever   3. Unusual abdominal pain    Take it easy today and resume normal activity tomorrow.It is common to have gas and mild bloating for a few hours. Pain is NOT normal. You may be groggy off and on for a few hours.    Resume previous diet.        Massimo Ayala Jr, MD  7/10/2024  12:05 PM

## 2024-07-10 NOTE — H&P
organomegaly.   Neurologic: Grossly intact.     Laboratory Data:  No results found for this or any previous visit (from the past 24 hour(s)).      Impression and Plan:  Colon screening  -Colonoscopy      Massimo Ayala Jr, MD  7/10/2024  9:50 AM

## 2024-07-10 NOTE — OP NOTE
Colonoscopy Procedure Note      Patient: Ruba Daniels MRN: [id number]  SSN: [id number]    YOB: 1960  Age: 63 y.o.  Sex: female      Date of Procedure: 7/10/2024  Date/Time:  7/10/2024 12:00 PM       IMPRESSION:     1.  Rectal polyps   2.  Sigmoid diverticulosis         RECOMMENDATIONS:     1) Check biopsy results.  2) Await pathology report. Call me in 2 weeks if you have not received any information from my office regarding your results.  3) Repeat colonoscopy in 2 to 3 years or as determined by the pathology report.       INDICATION: Colon screening    PROCEDURE PERFORMED: Colonoscopy with cold biopsies     DESCRIPTION OF PROCEDURE: An informed consent was obtained.  The patient was placed in left lateral position.  Perianal inspection and a digital rectal exam was performed.  Video colonoscope was introduced into the rectum and advanced under direct vision up to the terminal ileum.  With adequate insufflation and maneuvering of the withdrawing scope, the colonic mucosa was visualized carefully.  Retroflexion was performed in the rectum to see the anorectum and also in the ascending colon to look behind the folds.  Vital signs, pulse oximetry, single lead cardiac monitor were monitored throughout the procedure as the sedation was titrated to the desired effect ensuring patient comfort and safety.  The patient tolerated the procedure very well and was transferred to the recovery area. Following is the summary of findings: In 9 months small flat polyps were noted in the rectum removed 3 sets of these lesions there.  They range in size from 0.6 to 0.3 cm.  Patient also had presence of diverticulosis involving the sigmoid colon.        ENDOSCOPIST: Massimo Ayala Jr, MD     ENDOSCOPE: Olympus videocolonoscope     ASSISTANT:Circulator: Katey Altamirano RN              Scrub Person First: Hiral Morin     ANESTHESIA: TIVA      QUALITY OF PREPARATION: Good      FINDINGS:   Rectal

## 2024-07-10 NOTE — ANESTHESIA PRE PROCEDURE
Department of Anesthesiology  Preprocedure Note       Name:  Ruba Daniels   Age:  63 y.o.  :  1960                                          MRN:  [id number]         Date:  7/10/2024      Surgeon: Surgeon(s):  Massimo Ayala Jr., MD    Procedure: Procedure(s):  COLONOSCOPY    Medications prior to admission:   Prior to Admission medications    Medication Sig Start Date End Date Taking? Authorizing Provider   potassium chloride (MICRO-K) 10 MEQ extended release capsule Take 1 capsule by mouth daily   Yes Justino Ring MD   polyethylene glycol (GLYCOLAX) 17 GM/SCOOP powder Use as instructed by physician for colonoscopy prep 24   Massimo Ayala Jr., MD   albuterol (PROVENTIL) (2.5 MG/3ML) 0.083% nebulizer solution USE 1 VIAL VIA NEBULIZER EVERY 4 HOURS AS NEEDED FOR WHEEZDE 23   Justino Ring MD   dilTIAZem (CARDIZEM CD) 120 MG extended release capsule Take 1 capsule by mouth 2 times daily 23   Justino Ring MD   metoprolol succinate (TOPROL XL) 50 MG extended release tablet TAKE 1 TABLET BY MOUTH EVERY DAY(REPLACES 100MG) 23   Justino Ring MD   apixaban (ELIQUIS) 5 MG TABS tablet Take 1 tablet by mouth 2 times daily 3/4/22   Automatic Reconciliation, Ar   ferrous sulfate (IRON 325) 325 (65 Fe) MG tablet Take 1 tablet by mouth every morning (before breakfast)    Automatic Reconciliation, Ar   fluticasone-salmeterol (ADVAIR DISKUS) 250-50 MCG/ACT AEPB diskus inhaler Inhale 1 puff into the lungs 2 times daily 22   Automatic Reconciliation, Ar   hydroCHLOROthiazide (HYDRODIURIL) 25 MG tablet Take 1 tablet by mouth daily 3/16/22   Automatic Reconciliation, Ar   montelukast (SINGULAIR) 10 MG tablet Take 1 tablet by mouth 21   Automatic Reconciliation, Ar   olopatadine (PATANOL) 0.1 % ophthalmic solution Apply 1 drop to eye 2 times daily    Automatic Reconciliation, Ar   omeprazole (PRILOSEC) 20 MG delayed release capsule Take 1 capsule by mouth

## 2024-07-11 PROBLEM — Z12.11 ENCOUNTER FOR SCREENING FOR MALIGNANT NEOPLASM OF COLON: Status: RESOLVED | Noted: 2024-06-11 | Resolved: 2024-07-11

## 2024-07-31 NOTE — ANESTHESIA POSTPROCEDURE EVALUATION
Department of Anesthesiology  Postprocedure Note    Patient: Ruba Daniels  MRN: [id number]  YOB: 1960  Date of evaluation: 7/31/2024    Procedure Summary       Date: 07/10/24 Room / Location: [facility] ENDO 01 / [facility] END[facility]COPY    Anesthesia Start: 1113 Anesthesia Stop: 1156    Procedure: COLONOSCOPY BIOPSY (Anus) Diagnosis:       Encounter for screening for malignant neoplasm of colon      (Encounter for screening for malignant neoplasm of colon [Z12.11])    Surgeons: Massimo Ayala Jr., MD Responsible Provider: Cyndy Dial APRN - CRNA    Anesthesia Type: TIVA ASA Status: 2            Anesthesia Type: TIVA    Candi Phase I: Candi Score: 10    Candi Phase II: Candi Score: 10    Anesthesia Post Evaluation    Patient location during evaluation: bedside  Patient participation: complete - patient participated  Level of consciousness: awake and alert  Pain score: 0  Airway patency: patent  Nausea & Vomiting: no nausea  Cardiovascular status: blood pressure returned to baseline  Respiratory status: acceptable  Hydration status: euvolemic  Pain management: adequate    No notable events documented.

## 2025-06-09 ENCOUNTER — TRANSCRIBE ORDERS (OUTPATIENT)
Facility: HOSPITAL | Age: 65
End: 2025-06-09

## 2025-06-09 DIAGNOSIS — Z12.31 VISIT FOR SCREENING MAMMOGRAM: Primary | ICD-10-CM

## 2025-06-30 ENCOUNTER — HOSPITAL ENCOUNTER (OUTPATIENT)
Facility: HOSPITAL | Age: 65
Discharge: HOME | End: 2025-07-03
Attending: FAMILY MEDICINE
Payer: COMMERCIAL

## 2025-06-30 DIAGNOSIS — Z12.31 VISIT FOR SCREENING MAMMOGRAM: ICD-10-CM

## 2025-06-30 PROCEDURE — 77063 BREAST TOMOSYNTHESIS BI: CPT

## 2025-07-07 ENCOUNTER — HOSPITAL ENCOUNTER (EMERGENCY)
Facility: HOSPITAL | Age: 65
Discharge: HOME | End: 2025-07-08
Attending: EMERGENCY MEDICINE
Payer: COMMERCIAL

## 2025-07-07 DIAGNOSIS — R07.9 CHEST PAIN, UNSPECIFIED TYPE: Primary | ICD-10-CM

## 2025-07-07 LAB
ANION GAP SERPL CALC-SCNC: 4 MMOL/L (ref 2–12)
BASOPHILS # BLD: 0.03 K/UL (ref 0–0.1)
BASOPHILS NFR BLD: 0.3 % (ref 0–1)
BUN SERPL-MCNC: 14 MG/DL (ref 6–20)
BUN/CREAT SERPL: 16 (ref 12–20)
CA-I BLD-MCNC: 9 MG/DL (ref 8.5–10.1)
CHLORIDE SERPL-SCNC: 107 MMOL/L (ref 97–108)
CO2 SERPL-SCNC: 31 MMOL/L (ref 21–32)
CREAT SERPL-MCNC: 0.87 MG/DL (ref 0.55–1.02)
DIFFERENTIAL METHOD BLD: ABNORMAL
EOSINOPHIL # BLD: 0.14 K/UL (ref 0–0.4)
EOSINOPHIL NFR BLD: 1.5 % (ref 0–7)
ERYTHROCYTE [DISTWIDTH] IN BLOOD BY AUTOMATED COUNT: 15.5 % (ref 11.5–14.5)
GLUCOSE SERPL-MCNC: 97 MG/DL (ref 65–100)
HCT VFR BLD AUTO: 37.8 % (ref 35–47)
HGB BLD-MCNC: 12 G/DL (ref 11.5–16)
IMM GRANULOCYTES # BLD AUTO: 0.02 K/UL (ref 0–0.04)
IMM GRANULOCYTES NFR BLD AUTO: 0.2 % (ref 0–0.5)
LYMPHOCYTES # BLD: 3.09 K/UL (ref 0.8–3.5)
LYMPHOCYTES NFR BLD: 34.2 % (ref 12–49)
MAGNESIUM SERPL-MCNC: 2.2 MG/DL (ref 1.6–2.4)
MCH RBC QN AUTO: 24.1 PG (ref 26–34)
MCHC RBC AUTO-ENTMCNC: 31.7 G/DL (ref 30–36.5)
MCV RBC AUTO: 75.9 FL (ref 80–99)
MONOCYTES # BLD: 0.62 K/UL (ref 0–1)
MONOCYTES NFR BLD: 6.9 % (ref 5–13)
NEUTS SEG # BLD: 5.14 K/UL (ref 1.8–8)
NEUTS SEG NFR BLD: 56.9 % (ref 32–75)
NRBC # BLD: 0 K/UL (ref 0–0.01)
NRBC BLD-RTO: 0 PER 100 WBC
PLATELET # BLD AUTO: 270 K/UL (ref 150–400)
PMV BLD AUTO: 10.3 FL (ref 8.9–12.9)
POTASSIUM SERPL-SCNC: 3.9 MMOL/L (ref 3.5–5.1)
RBC # BLD AUTO: 4.98 M/UL (ref 3.8–5.2)
SODIUM SERPL-SCNC: 142 MMOL/L (ref 136–145)
TROPONIN I SERPL HS-MCNC: 8 NG/L (ref 0–51)
WBC # BLD AUTO: 9 K/UL (ref 3.6–11)

## 2025-07-07 PROCEDURE — 83735 ASSAY OF MAGNESIUM: CPT

## 2025-07-07 PROCEDURE — 84484 ASSAY OF TROPONIN QUANT: CPT

## 2025-07-07 PROCEDURE — 99284 EMERGENCY DEPT VISIT MOD MDM: CPT

## 2025-07-07 PROCEDURE — 80048 BASIC METABOLIC PNL TOTAL CA: CPT

## 2025-07-07 PROCEDURE — 36415 COLL VENOUS BLD VENIPUNCTURE: CPT

## 2025-07-07 PROCEDURE — 93005 ELECTROCARDIOGRAM TRACING: CPT | Performed by: EMERGENCY MEDICINE

## 2025-07-07 PROCEDURE — 6370000000 HC RX 637 (ALT 250 FOR IP): Performed by: EMERGENCY MEDICINE

## 2025-07-07 PROCEDURE — 85025 COMPLETE CBC W/AUTO DIFF WBC: CPT

## 2025-07-07 RX ORDER — FAMOTIDINE 20 MG/1
20 TABLET, FILM COATED ORAL ONCE
Status: COMPLETED | OUTPATIENT
Start: 2025-07-07 | End: 2025-07-07

## 2025-07-07 RX ADMIN — FAMOTIDINE 20 MG: 20 TABLET, FILM COATED ORAL at 21:58

## 2025-07-08 VITALS
BODY MASS INDEX: 37.17 KG/M2 | HEART RATE: 76 BPM | OXYGEN SATURATION: 96 % | TEMPERATURE: 98.3 F | WEIGHT: 202 LBS | RESPIRATION RATE: 17 BRPM | HEIGHT: 62 IN | DIASTOLIC BLOOD PRESSURE: 96 MMHG | SYSTOLIC BLOOD PRESSURE: 175 MMHG

## 2025-07-08 LAB
EKG ATRIAL RATE: 81 BPM
EKG DIAGNOSIS: NORMAL
EKG P AXIS: 41 DEGREES
EKG P-R INTERVAL: 199 MS
EKG Q-T INTERVAL: 370 MS
EKG QRS DURATION: 95 MS
EKG QTC CALCULATION (BAZETT): 430 MS
EKG R AXIS: -10 DEGREES
EKG T AXIS: 15 DEGREES
EKG VENTRICULAR RATE: 81 BPM
TROPONIN I SERPL HS-MCNC: 10 NG/L (ref 0–51)

## 2025-07-08 PROCEDURE — 93010 ELECTROCARDIOGRAM REPORT: CPT | Performed by: SPECIALIST

## 2025-07-08 RX ORDER — OMEPRAZOLE 20 MG/1
40 CAPSULE, DELAYED RELEASE ORAL DAILY
Qty: 60 CAPSULE | Refills: 0 | Status: SHIPPED | OUTPATIENT
Start: 2025-07-08 | End: 2025-08-07

## 2025-07-08 RX ORDER — CALCIUM CARBONATE 500 MG/1
1 TABLET, CHEWABLE ORAL DAILY
Qty: 30 TABLET | Refills: 0 | Status: SHIPPED | OUTPATIENT
Start: 2025-07-08 | End: 2025-08-07

## 2025-07-08 ASSESSMENT — HEART SCORE: ECG: NON-SPECIFC REPOLARIZATION DISTURBANCE/LBTB/PM

## 2025-07-08 ASSESSMENT — LIFESTYLE VARIABLES
HOW OFTEN DO YOU HAVE A DRINK CONTAINING ALCOHOL: NEVER
HOW MANY STANDARD DRINKS CONTAINING ALCOHOL DO YOU HAVE ON A TYPICAL DAY: PATIENT DOES NOT DRINK

## 2025-07-08 NOTE — DISCHARGE INSTRUCTIONS
Thank you for choosing our Emergency Department for your care.  It is our privilege to care for you in your time of need.  In the next several days, you may receive a survey via email or mailed to your home about your experience with our team.  We would greatly appreciate you taking a few minutes to complete the survey, as we use this information to learn what we have done well and what we could be doing better. Thank you for trusting us with your care!    Below you will find a list of your tests from today's visit.   Labs and Radiology Studies  Recent Results (from the past 12 hours)   EKG 12 Lead    Collection Time: 07/07/25  9:17 PM   Result Value Ref Range    Ventricular Rate 81 BPM    Atrial Rate 81 BPM    P-R Interval 199 ms    QRS Duration 95 ms    Q-T Interval 370 ms    QTc Calculation (Bazett) 430 ms    P Axis 41 degrees    R Axis -10 degrees    T Axis 15 degrees    Diagnosis Sinus rhythm  Left ventricular hypertrophy      Magnesium    Collection Time: 07/07/25  9:53 PM   Result Value Ref Range    Magnesium 2.2 1.6 - 2.4 mg/dL   CBC with Auto Differential    Collection Time: 07/07/25  9:53 PM   Result Value Ref Range    WBC 9.0 3.6 - 11.0 K/uL    RBC 4.98 3.80 - 5.20 M/uL    Hemoglobin 12.0 11.5 - 16.0 g/dL    Hematocrit 37.8 35.0 - 47.0 %    MCV 75.9 (L) 80.0 - 99.0 FL    MCH 24.1 (L) 26.0 - 34.0 PG    MCHC 31.7 30.0 - 36.5 g/dL    RDW 15.5 (H) 11.5 - 14.5 %    Platelets 270 150 - 400 K/uL    MPV 10.3 8.9 - 12.9 FL    Nucleated RBCs 0.0 0.0  WBC    nRBC 0.00 0.00 - 0.01 K/uL    Neutrophils % 56.9 32.0 - 75.0 %    Lymphocytes % 34.2 12.0 - 49.0 %    Monocytes % 6.9 5.0 - 13.0 %    Eosinophils % 1.5 0.0 - 7.0 %    Basophils % 0.3 0.0 - 1.0 %    Immature Granulocytes % 0.2 0 - 0.5 %    Neutrophils Absolute 5.14 1.80 - 8.00 K/UL    Lymphocytes Absolute 3.09 0.80 - 3.50 K/UL    Monocytes Absolute 0.62 0.00 - 1.00 K/UL    Eosinophils Absolute 0.14 0.00 - 0.40 K/UL    Basophils Absolute 0.03 0.00 -

## 2025-07-08 NOTE — ED TRIAGE NOTES
Patient reported chest pain that started a few minutes ago but went away. Patient has a hx of A-fib.

## 2025-07-09 NOTE — ED PROVIDER NOTES
[facility] EMERGENCY DEPT  EMERGENCY DEPARTMENT HISTORY AND PHYSICAL EXAM      Date of evaluation: 7/7/2025  Patient Name: Ruba Daniels  Birthdate 1960  MRN: [id number]  ED Provider: Rogers Webb MD   Note Started: 6:56 AM EDT 7/9/25    HISTORY OF PRESENT ILLNESS     Chief Complaint   Patient presents with    Chest Pain       History Provided By: Patient, only     HPI: Ruba Daniels is a 64 y.o. female This note was generated using [email or web address] software. If any parties were recorded to generate this note outside of the software user, their consent was obtained prior to recording their voices. All portions of this note were verified by the clinician prior to inclusion in the medical record.    Chief Complaint  - Chest pain, described as \"I can't describe it\", lasting about 5 minutes, now resolved.  - Rapid heart rate    History of Present Illness  Ruba Daniels, a 64-year-old female, presents to the emergency department with a chief complaint of chest pain that started just prior to arrival and has since resolved. She has hx of  of atrial fibrillation but denies other heart problems.    The patient reports experiencing chest pain that lasted approximately 5 minutes. She describes the pain as located \"right in the middle\" of her chest, without radiation to the neck, back, or shoulders. Ms. Daniels has difficulty characterizing the nature of the pain, stating she \"can't describe it.\" She denies feeling pressure, sharpness, or fullness in her chest. The patient is unsure about what she was doing when the pain started or if she had recently eaten. She also mentions experiencing a high heart rate, though the duration of this symptom is unclear.    Ms. Daniels denies any history of heart attacks or stent placements. She reports no recent illnesses or swelling in her legs.    Review of Systems  All pertinent positives and negatives from ROS in the HPI.    Review of Systems  Cardiovascular: Negative for chest pain

## (undated) DEVICE — SOLUTION IRRIG 1000ML STRL H2O USP PLAS POUR BTL

## (undated) DEVICE — FORCEPS BX L240CM JAW DIA2.4MM ORNG L CAP W/ NDL DISP RAD

## (undated) DEVICE — 1200CC GUARDIAN II: Brand: GUARDIAN

## (undated) DEVICE — GLOVE ORANGE PI 7 1/2   MSG9075

## (undated) DEVICE — SPONGE GZ W4XL4IN COT 12 PLY TYP VII WVN C FLD DSGN STERILE

## (undated) DEVICE — LINE SAMPLING ADVANCE ORAL NASAL MICROSTREAM O2 TUBING 6.5'

## (undated) DEVICE — PAD,PREPPING,CUFFED,24X48,7",NONSTERILE: Brand: MEDLINE

## (undated) DEVICE — JELLY,LUBE,STERILE,FLIP TOP,TUBE,4-OZ: Brand: MEDLINE

## (undated) DEVICE — TUBING, SUCTION, 9/32" X 10', STRAIGHT: Brand: MEDLINE